# Patient Record
Sex: FEMALE | Race: WHITE | Employment: FULL TIME | URBAN - METROPOLITAN AREA
[De-identification: names, ages, dates, MRNs, and addresses within clinical notes are randomized per-mention and may not be internally consistent; named-entity substitution may affect disease eponyms.]

---

## 2017-01-31 ENCOUNTER — HOSPITAL ENCOUNTER (OUTPATIENT)
Dept: OTHER | Age: 52
Discharge: HOME | End: 2017-01-31
Attending: FAMILY MEDICINE | Admitting: FAMILY MEDICINE

## 2017-01-31 LAB
BASOPHILS ABSOLUTE: 0.1 K/UL (ref 0–0.2)
BASOPHILS RELATIVE PERCENT: 1 %
CHOLESTEROL, TOTAL: 140 MG/DL (ref 0–199)
CREATININE URINE: 351.5 MG/DL (ref 28–259)
EOSINOPHILS ABSOLUTE: 0.7 K/UL (ref 0–0.6)
EOSINOPHILS RELATIVE PERCENT: 9.8 %
HCT VFR BLD CALC: 44.1 % (ref 36–48)
HDLC SERPL-MCNC: 44 MG/DL (ref 40–60)
HEMOGLOBIN: 14.9 G/DL (ref 12–16)
LDL CHOLESTEROL CALCULATED: 69 MG/DL
LYMPHOCYTES ABSOLUTE: 2.8 K/UL (ref 1–5.1)
LYMPHOCYTES RELATIVE PERCENT: 38 %
MCH RBC QN AUTO: 28.1 PG (ref 26–34)
MCHC RBC AUTO-ENTMCNC: 33.7 G/DL (ref 31–36)
MCV RBC AUTO: 83.5 FL (ref 80–100)
MICROALBUMIN UR-MCNC: 1.7 MG/DL
MICROALBUMIN/CREAT UR-RTO: 4.8 MG/G (ref 0–30)
MONOCYTES ABSOLUTE: 0.7 K/UL (ref 0–1.3)
MONOCYTES RELATIVE PERCENT: 8.9 %
NEUTROPHILS ABSOLUTE: 3.1 K/UL (ref 1.7–7.7)
NEUTROPHILS RELATIVE PERCENT: 42.3 %
PDW BLD-RTO: 13.3 % (ref 12.4–15.4)
PLATELET # BLD: 220 K/UL (ref 135–450)
PMV BLD AUTO: 9.5 FL (ref 5–10.5)
RBC # BLD: 5.28 M/UL (ref 4–5.2)
TRIGL SERPL-MCNC: 136 MG/DL (ref 0–150)
TSH SERPL DL<=0.05 MIU/L-ACNC: 3.2 UIU/ML (ref 0.27–4.2)
VLDLC SERPL CALC-MCNC: 27 MG/DL
WBC # BLD: 7.3 K/UL (ref 4–11)

## 2017-02-01 LAB
ESTIMATED AVERAGE GLUCOSE: 171.4 MG/DL
HBA1C MFR BLD: 7.6 %

## 2017-04-11 ENCOUNTER — EMPLOYEE WELLNESS (OUTPATIENT)
Dept: OTHER | Age: 52
End: 2017-04-11

## 2017-04-11 LAB
CHOLESTEROL, TOTAL: 153 MG/DL (ref 0–199)
GLUCOSE BLD-MCNC: 172 MG/DL (ref 70–99)
HDLC SERPL-MCNC: 44 MG/DL (ref 40–60)
LDL CHOLESTEROL CALCULATED: 79 MG/DL
TRIGL SERPL-MCNC: 151 MG/DL (ref 0–150)

## 2017-04-12 LAB
ESTIMATED AVERAGE GLUCOSE: 171.4 MG/DL
HBA1C MFR BLD: 7.6 %

## 2017-05-13 ENCOUNTER — HOSPITAL ENCOUNTER (OUTPATIENT)
Dept: OTHER | Age: 52
Discharge: HOME | End: 2017-05-13
Attending: FAMILY MEDICINE | Admitting: FAMILY MEDICINE

## 2017-05-13 LAB
A/G RATIO: 1.3 (ref 1.1–2.2)
ALBUMIN SERPL-MCNC: 3.9 G/DL (ref 3.4–5)
ALP BLD-CCNC: 101 U/L (ref 40–129)
ALT SERPL-CCNC: 71 U/L (ref 10–40)
ANION GAP SERPL CALCULATED.3IONS-SCNC: 12 MMOL/L (ref 3–16)
AST SERPL-CCNC: 83 U/L (ref 15–37)
BASOPHILS ABSOLUTE: 0.1 K/UL (ref 0–0.2)
BASOPHILS RELATIVE PERCENT: 0.8 %
BILIRUB SERPL-MCNC: 0.6 MG/DL (ref 0–1)
BUN BLDV-MCNC: 7 MG/DL (ref 7–20)
CALCIUM SERPL-MCNC: 8.6 MG/DL (ref 8.3–10.6)
CHLORIDE BLD-SCNC: 103 MMOL/L (ref 99–110)
CO2: 25 MMOL/L (ref 21–32)
CREAT SERPL-MCNC: 0.6 MG/DL (ref 0.6–1.1)
EOSINOPHILS ABSOLUTE: 0.5 K/UL (ref 0–0.6)
EOSINOPHILS RELATIVE PERCENT: 5.1 %
GFR AFRICAN AMERICAN: >60
GFR NON-AFRICAN AMERICAN: >60
GLOBULIN: 2.9 G/DL
GLUCOSE BLD-MCNC: 150 MG/DL (ref 70–99)
HCT VFR BLD CALC: 41.4 % (ref 36–48)
HEMOGLOBIN: 14 G/DL (ref 12–16)
LYMPHOCYTES ABSOLUTE: 3.1 K/UL (ref 1–5.1)
LYMPHOCYTES RELATIVE PERCENT: 32.6 %
MCH RBC QN AUTO: 28.1 PG (ref 26–34)
MCHC RBC AUTO-ENTMCNC: 33.7 G/DL (ref 31–36)
MCV RBC AUTO: 83.2 FL (ref 80–100)
MONOCYTES ABSOLUTE: 0.6 K/UL (ref 0–1.3)
MONOCYTES RELATIVE PERCENT: 6 %
NEUTROPHILS ABSOLUTE: 5.3 K/UL (ref 1.7–7.7)
NEUTROPHILS RELATIVE PERCENT: 55.5 %
PDW BLD-RTO: 13.3 % (ref 12.4–15.4)
PLATELET # BLD: 196 K/UL (ref 135–450)
PMV BLD AUTO: 9.6 FL (ref 5–10.5)
POTASSIUM SERPL-SCNC: 3.8 MMOL/L (ref 3.5–5.1)
RBC # BLD: 4.98 M/UL (ref 4–5.2)
SODIUM BLD-SCNC: 140 MMOL/L (ref 136–145)
TOTAL PROTEIN: 6.8 G/DL (ref 6.4–8.2)
TSH SERPL DL<=0.05 MIU/L-ACNC: 3.46 UIU/ML (ref 0.27–4.2)
WBC # BLD: 9.6 K/UL (ref 4–11)

## 2017-05-15 LAB
ESTIMATED AVERAGE GLUCOSE: 180 MG/DL
HBA1C MFR BLD: 7.9 %

## 2017-06-30 ENCOUNTER — OFFICE VISIT (OUTPATIENT)
Dept: URGENT CARE | Age: 52
End: 2017-06-30

## 2017-06-30 VITALS
OXYGEN SATURATION: 93 % | DIASTOLIC BLOOD PRESSURE: 74 MMHG | RESPIRATION RATE: 16 BRPM | TEMPERATURE: 99.5 F | BODY MASS INDEX: 39.87 KG/M2 | HEIGHT: 67 IN | HEART RATE: 96 BPM | WEIGHT: 254 LBS | SYSTOLIC BLOOD PRESSURE: 116 MMHG

## 2017-06-30 DIAGNOSIS — J20.9 ACUTE BRONCHITIS, UNSPECIFIED ORGANISM: Primary | ICD-10-CM

## 2017-06-30 PROCEDURE — 99203 OFFICE O/P NEW LOW 30 MIN: CPT | Performed by: EMERGENCY MEDICINE

## 2017-06-30 RX ORDER — GUAIFENESIN AND DEXTROMETHORPHAN HYDROBROMIDE 600; 30 MG/1; MG/1
1 TABLET, EXTENDED RELEASE ORAL 2 TIMES DAILY PRN
Qty: 18 TABLET | Refills: 0 | Status: SHIPPED | OUTPATIENT
Start: 2017-06-30 | End: 2018-01-24 | Stop reason: ALTCHOICE

## 2017-06-30 RX ORDER — AZITHROMYCIN 250 MG/1
TABLET, FILM COATED ORAL
Qty: 1 PACKET | Refills: 0 | Status: SHIPPED | OUTPATIENT
Start: 2017-06-30 | End: 2017-07-10

## 2017-06-30 RX ORDER — BENZONATATE 200 MG/1
200 CAPSULE ORAL 3 TIMES DAILY PRN
Qty: 15 CAPSULE | Refills: 0 | Status: SHIPPED | OUTPATIENT
Start: 2017-06-30 | End: 2018-01-24 | Stop reason: ALTCHOICE

## 2017-06-30 RX ORDER — SERTRALINE HYDROCHLORIDE 100 MG/1
100 TABLET, FILM COATED ORAL EVERY EVENING
COMMUNITY

## 2017-06-30 RX ORDER — ATORVASTATIN CALCIUM 80 MG/1
80 TABLET, FILM COATED ORAL EVERY EVENING
COMMUNITY

## 2017-06-30 RX ORDER — OMEPRAZOLE 40 MG/1
40 CAPSULE, DELAYED RELEASE ORAL EVERY EVENING
COMMUNITY
End: 2022-01-10

## 2017-06-30 RX ORDER — GLYBURIDE 2.5 MG/1
2.5 TABLET ORAL
COMMUNITY
End: 2018-01-24 | Stop reason: SDUPTHER

## 2017-06-30 ASSESSMENT — ENCOUNTER SYMPTOMS
WHEEZING: 1
EYE PAIN: 0
ABDOMINAL PAIN: 0
RHINORRHEA: 0
EYE DISCHARGE: 0
COUGH: 1
DIARRHEA: 0
HEMOPTYSIS: 0
SHORTNESS OF BREATH: 1
TROUBLE SWALLOWING: 0
NAUSEA: 0
VOMITING: 0
SORE THROAT: 1

## 2017-08-11 ENCOUNTER — HOSPITAL ENCOUNTER (OUTPATIENT)
Dept: MAMMOGRAPHY | Age: 52
Discharge: HOME | End: 2017-08-11
Attending: FAMILY MEDICINE | Admitting: FAMILY MEDICINE

## 2017-08-11 DIAGNOSIS — Z12.31 VISIT FOR SCREENING MAMMOGRAM: ICD-10-CM

## 2017-11-06 ENCOUNTER — OFFICE VISIT (OUTPATIENT)
Dept: URGENT CARE | Age: 52
End: 2017-11-06

## 2017-11-06 VITALS
BODY MASS INDEX: 39.71 KG/M2 | OXYGEN SATURATION: 94 % | TEMPERATURE: 97.6 F | HEIGHT: 67 IN | SYSTOLIC BLOOD PRESSURE: 118 MMHG | DIASTOLIC BLOOD PRESSURE: 81 MMHG | WEIGHT: 253 LBS | RESPIRATION RATE: 14 BRPM | HEART RATE: 90 BPM

## 2017-11-06 DIAGNOSIS — J01.90 ACUTE SINUSITIS, RECURRENCE NOT SPECIFIED, UNSPECIFIED LOCATION: Primary | ICD-10-CM

## 2017-11-06 PROCEDURE — 99212 OFFICE O/P EST SF 10 MIN: CPT | Performed by: PHYSICIAN ASSISTANT

## 2017-11-06 RX ORDER — METHYLPREDNISOLONE 4 MG/1
TABLET ORAL
Qty: 1 KIT | Refills: 0 | Status: SHIPPED | OUTPATIENT
Start: 2017-11-06 | End: 2018-01-24 | Stop reason: ALTCHOICE

## 2017-11-06 RX ORDER — OXYBUTYNIN CHLORIDE 5 MG/1
1 TABLET ORAL 3 TIMES DAILY
COMMUNITY
Start: 2017-07-03 | End: 2018-01-24 | Stop reason: ALTCHOICE

## 2017-11-06 ASSESSMENT — ENCOUNTER SYMPTOMS
STRIDOR: 0
COUGH: 1
SORE THROAT: 1
DIARRHEA: 1
HEMOPTYSIS: 0
EYE DISCHARGE: 0
EYE REDNESS: 0
VOMITING: 0
SPUTUM PRODUCTION: 0
WHEEZING: 0
SHORTNESS OF BREATH: 0
NAUSEA: 0
SINUS PAIN: 1
ABDOMINAL PAIN: 0

## 2017-11-06 NOTE — PATIENT INSTRUCTIONS
Patient Education        Sinusitis: Care Instructions  Your Care Instructions    Sinusitis is an infection of the lining of the sinus cavities in your head. Sinusitis often follows a cold. It causes pain and pressure in your head and face. In most cases, sinusitis gets better on its own in 1 to 2 weeks. But some mild symptoms may last for several weeks. Sometimes antibiotics are needed. Follow-up care is a key part of your treatment and safety. Be sure to make and go to all appointments, and call your doctor if you are having problems. It's also a good idea to know your test results and keep a list of the medicines you take. How can you care for yourself at home? · Take an over-the-counter pain medicine, such as acetaminophen (Tylenol), ibuprofen (Advil, Motrin), or naproxen (Aleve). Read and follow all instructions on the label. · If the doctor prescribed antibiotics, take them as directed. Do not stop taking them just because you feel better. You need to take the full course of antibiotics. · Be careful when taking over-the-counter cold or flu medicines and Tylenol at the same time. Many of these medicines have acetaminophen, which is Tylenol. Read the labels to make sure that you are not taking more than the recommended dose. Too much acetaminophen (Tylenol) can be harmful. · Breathe warm, moist air from a steamy shower, a hot bath, or a sink filled with hot water. Avoid cold, dry air. Using a humidifier in your home may help. Follow the directions for cleaning the machine. · Use saline (saltwater) nasal washes to help keep your nasal passages open and wash out mucus and bacteria. You can buy saline nose drops at a grocery store or drugstore. Or you can make your own at home by adding 1 teaspoon of salt and 1 teaspoon of baking soda to 2 cups of distilled water. If you make your own, fill a bulb syringe with the solution, insert the tip into your nostril, and squeeze gently. Cathleen Fogo your nose.   · Put a hot, wet towel or a warm gel pack on your face 3 or 4 times a day for 5 to 10 minutes each time. · Try a decongestant nasal spray like oxymetazoline (Afrin). Do not use it for more than 3 days in a row. Using it for more than 3 days can make your congestion worse. When should you call for help? Call your doctor now or seek immediate medical care if:  · You have new or worse swelling or redness in your face or around your eyes. · You have a new or higher fever. Watch closely for changes in your health, and be sure to contact your doctor if:  · You have new or worse facial pain. · The mucus from your nose becomes thicker (like pus) or has new blood in it. · You are not getting better as expected. Where can you learn more? Go to https://VidyopeosmanyC$ [email or web address].DescribeMe. org and sign in to your [employer] account. Enter U427 in the [employer] box to learn more about \"Sinusitis: Care Instructions. \"     If you do not have an account, please click on the \"Sign Up Now\" link. Current as of: July 29, 2016  Content Version: 11.3  © 7465-9105 [employer]. Care instructions adapted under license by [facility] ([facility]). If you have questions about a medical condition or this instruction, always ask your healthcare professional. [address]  any warranty or liability for your use of this information.

## 2017-11-06 NOTE — PROGRESS NOTES
Reason for Visit:   Chief Complaint   Patient presents with    Sinus Problem     sneezing, sinus pain, coughing x 2 days     Patient History     HPI: Christian Frank is a 50 y.o. female who presents with frequent sneezing, nasal congestion, sinus pressure, PND, and sore throat for approx 2 days. This is a new problem. She reports occasional cough with this. She denies any hx of seasonal allergies or recent ill contacts. She denies fever, chest congestion, wheeze, abdominal pain, N/V, diarrhea, or rash. Past Medical History:   Diagnosis Date    Depression     GERD (gastroesophageal reflux disease)     Hyperlipidemia     Type 2 diabetes mellitus without complication (HCC)        Past Surgical History:   Procedure Laterality Date    CHOLECYSTECTOMY      HYSTERECTOMY         Allergies: No Known Allergies    Review of Systems     ROS: Please refer to HPI for any pertinent positive findings, as all other systems were reviewed as negative unless otherwise listed above. Review of Systems   Constitutional: Negative for chills, diaphoresis, fever and malaise/fatigue. HENT: Positive for congestion, sinus pain and sore throat. Negative for ear pain. Eyes: Negative for discharge and redness. Respiratory: Positive for cough. Negative for hemoptysis, sputum production, shortness of breath, wheezing and stridor. Gastrointestinal: Positive for diarrhea. Negative for abdominal pain, nausea and vomiting. Skin: Negative for rash. Neurological: Negative for dizziness and headaches. Physical Exam     Vitals:    11/06/17 0933   BP: 118/81   Pulse: 90   Resp: 14   Temp: 97.6 °F (36.4 °C)   SpO2: 94%       Constitutional:  Well developed, well nourished, no acute distress, non-toxic appearance   Eyes:  PERRL, conjunctiva normal, no ciliary injection, evidence of foreign body, or discharge.   HENT:  Head is normocephalic, atraumatic; external ears and TMs normal bilaterally, Nasal mucosa congested, oropharynx pink and moist, no pharyngeal exudates. Neck- Supple, passive and active range of motion in tact, no tenderness upon palpation along spine. No LAD  or neck masses appreciated. Respiratory:  No respiratory distress, normal breath sounds bilaterally, no rales, no wheezing. Cardiovascular:  Normal rate, normal rhythm, no murmurs, no gallops, no rubs   GI:  Abdomen soft, nontender, nondistended, normal bowel sounds, no organomegaly, no mass, no rebound, no guarding. Musculoskeletal:  No pain upon palpation along spine or musculature. No edema, no tenderness, no deformities. Integument:  Well hydrated, no lesions, cyanosis, or rashes appreciated. Lymphatic:  No lymphadenopathy noted   Neurologic:  Alert & oriented x 3, CN 2-12 in tact bilaterally, normal motor function and sensation in tact, no focal deficits noted   Psychiatric:  Speech and behavior appropriate. Appropriate mood and affect. Physical Exam    Procedure:   none    Assessment:    1. Acute sinusitis, recurrence not specified, unspecified location        Plan:    - Discussed likely viral URI vs possible environmental allergy. Fluids, nasal saline, humidification and rest encouraged. Patient encouraged to start using nasal steroid and oral antihistamine for the next 1-2 weeks and OTC meds as needed for any cough. Follow up with PCP in the next 3-5 days if sxs and/or fever persists. Orders Placed This Encounter   Medications    methylPREDNISolone (MEDROL, MERCEDEZ,) 4 MG tablet     Sig: Take by mouth.      Dispense:  1 kit     Refill:  0

## 2017-11-20 ENCOUNTER — CLINICAL DOCUMENTATION (OUTPATIENT)
Dept: PHARMACY | Facility: CLINIC | Age: 52
End: 2017-11-20

## 2017-11-20 NOTE — LETTER
? Visit with your physician (Second yearly visit)  ? Second A1c  ? Flu vaccination   ? Requirements if A1c is greater than 8 percent:  ? Engage with a [facility] ([facility]) diabetes educator at one of our hospital locations or an ambulatory care coordinator by phone, if your A1c is greater than 8 percent. We will be reviewing your [employer] account and sending you reminders for needed actions. Please remember if you dont have a [address], you will need to submit documentation to [email or web address] or by fax to [phone]. As a reminder, if programs requirements are not met, your benefit may be terminated and you will not be eligible to participate in the program next year. Thank you for participating in the program and taking steps to improve your health.

## 2018-01-23 ENCOUNTER — SCHEDULED TELEPHONE ENCOUNTER (OUTPATIENT)
Dept: PHARMACY | Facility: CLINIC | Age: 53
End: 2018-01-23

## 2018-01-23 DIAGNOSIS — Z71.89 ENCOUNTER FOR MEDICATION REVIEW AND COUNSELING: Primary | ICD-10-CM

## 2018-01-24 ENCOUNTER — HOSPITAL ENCOUNTER (OUTPATIENT)
Dept: OTHER | Age: 53
Discharge: HOME | End: 2018-01-24
Attending: INTERNAL MEDICINE | Admitting: INTERNAL MEDICINE

## 2018-01-24 VITALS
HEIGHT: 67 IN | DIASTOLIC BLOOD PRESSURE: 68 MMHG | SYSTOLIC BLOOD PRESSURE: 125 MMHG | BODY MASS INDEX: 40.81 KG/M2 | WEIGHT: 260 LBS | TEMPERATURE: 97.9 F | RESPIRATION RATE: 16 BRPM | OXYGEN SATURATION: 95 % | HEART RATE: 71 BPM

## 2018-01-24 LAB
GLUCOSE BLD-MCNC: 142 MG/DL (ref 70–99)
GLUCOSE BLD-MCNC: 164 MG/DL (ref 70–99)
PERFORMED ON: ABNORMAL
PERFORMED ON: ABNORMAL

## 2018-01-24 RX ORDER — SODIUM CHLORIDE, SODIUM LACTATE, POTASSIUM CHLORIDE, CALCIUM CHLORIDE 600; 310; 30; 20 MG/100ML; MG/100ML; MG/100ML; MG/100ML
INJECTION, SOLUTION INTRAVENOUS CONTINUOUS
Status: CANCELLED | OUTPATIENT
Start: 2018-01-24

## 2018-01-24 RX ORDER — SOLIFENACIN SUCCINATE 10 MG/1
10 TABLET, FILM COATED ORAL EVERY EVENING
COMMUNITY

## 2018-01-24 RX ORDER — METFORMIN HYDROCHLORIDE 500 MG/1
2000 TABLET, EXTENDED RELEASE ORAL EVERY EVENING
Qty: 1 TABLET | Refills: 0 | COMMUNITY
Start: 2018-01-24

## 2018-01-24 RX ORDER — SODIUM CHLORIDE, SODIUM LACTATE, POTASSIUM CHLORIDE, CALCIUM CHLORIDE 600; 310; 30; 20 MG/100ML; MG/100ML; MG/100ML; MG/100ML
INJECTION, SOLUTION INTRAVENOUS CONTINUOUS
Status: DISCONTINUED | OUTPATIENT
Start: 2018-01-24 | End: 2018-01-25 | Stop reason: HOSPADM

## 2018-01-24 RX ORDER — GLIMEPIRIDE 2 MG/1
2 TABLET ORAL EVERY EVENING
Qty: 1 TABLET | Refills: 0 | COMMUNITY
Start: 2018-01-24 | End: 2020-04-30

## 2018-01-24 RX ADMIN — SODIUM CHLORIDE, SODIUM LACTATE, POTASSIUM CHLORIDE, CALCIUM CHLORIDE: 600; 310; 30; 20 INJECTION, SOLUTION INTRAVENOUS at 07:05

## 2018-01-24 ASSESSMENT — PAIN SCALES - GENERAL
PAINLEVEL_OUTOF10: 0
PAINLEVEL_OUTOF10: 0

## 2018-01-24 ASSESSMENT — PAIN - FUNCTIONAL ASSESSMENT: PAIN_FUNCTIONAL_ASSESSMENT: 0-10

## 2018-01-24 NOTE — TELEPHONE ENCOUNTER
Reviewed and agree with PharmD candidate with the following modifications: per reconciliation of outside immunizations, received [id number] 7/9/2013 which is up to date. Updated medication list per below. Updated DM program gaps identified.     Gordon WilsonD, [facility] Pharmacist  Direct: [phone]  Dept: [phone] (toll free [phone], option 7)
Value Date    LDLCALC 79 04/11/2017     ALT   Date Value Ref Range Status   05/13/2017 71 (H) 10 - 40 U/L Final     The 10-year ASCVD risk score (Yoni Varner et al., 2013) is: 1.9%    Values used to calculate the score:      Age: 50 years      Sex: Female      Is Non- : No      Diabetic: Yes      Tobacco smoker: No      Systolic Blood Pressure: 368 mmHg      Is BP treated: No      HDL Cholesterol: 44 mg/dL      Total Cholesterol: 153 mg/dL       Immunizations: There is no immunization history on file for this patient. Smoking Status:  History   Smoking Status    Never Smoker   Smokeless Tobacco    Not on file      ASSESSMENT:  Initial Program Requirements (to be completed by 7/1/2018):  [] OV with provider for DM (1st)  [] A1c (1st)  [x] On statin or contraindication(s) On atorvastatin   [x] On ACEi/ARB or contraindication(s) Normal blood pressure, urinary albumin-to-creatinine ratio, and eGFR     Ongoing Program Requirements (to be completed by 12/15/2018):  [] OV with provider for DM (2nd)  [] ACC/diabetes educator visit (if A1c over 8%)  [] A1c (2nd)  [] Lipid panel  [] Urine protein  [] Pneumococcal vaccination: up to date  [] Influenza vaccination for 4598-8150  [] Medication adherence over 70%    Formulary Medication Review:  - Medications/supplies on diabetes program formulary (up to $600 in waived copays, if filled through [facility] (mail order)): metformin, atorvastatin, glimepiride, agamatrix meter and supplies      Other Considerations:  - Glycemic Goal: <7.0%. Is not at blood glucose goal but is on metformin and glimepiride  therapy. .   - Blood Pressure Goal: BP less than 140/90 mmHg due to history of DM: Is at blood pressure goal   - Lipids: Patient is 43-69 years of age with DM, a 10 year risk score under 7.5% and no signs of ASCVD. so is a candidate for moderate intensity statin . patient is on high intensity.  Continue to assess lipid panel results and

## 2018-01-24 NOTE — H&P
Pre-sedation Assessment    HPI: screening  Nurses notes reviewed and agreed. Medications and Allergies reviewed    Physical Exam:  Airway:Normal  Cardiac:Normal  Pulmonary:Normal  Abdomen:Normal    Assessment/Plan:  ASA Grade 2 - Patient with mild systemic disease with no functional limitations  Level of Sedation Plan: Moderate sedation  Post Procedure plan: Return to same level of care  I have explained the risk, benefits, and alternatives to the procedure. The patient understands and agrees to proceed.   Yes    Kale Sam  7:02 AM 1/24/2018

## 2018-01-24 NOTE — PROGRESS NOTES
Received patient from GI room. Report received from [facility]. Patient is drowsy. Family at bedside.

## 2018-03-14 ENCOUNTER — OFFICE VISIT (OUTPATIENT)
Dept: URGENT CARE | Age: 53
End: 2018-03-14

## 2018-03-14 VITALS
TEMPERATURE: 96.3 F | BODY MASS INDEX: 38.92 KG/M2 | HEIGHT: 67 IN | DIASTOLIC BLOOD PRESSURE: 71 MMHG | HEART RATE: 70 BPM | SYSTOLIC BLOOD PRESSURE: 117 MMHG | RESPIRATION RATE: 16 BRPM | WEIGHT: 248 LBS | OXYGEN SATURATION: 96 %

## 2018-03-14 DIAGNOSIS — N39.0 URINARY TRACT INFECTION WITHOUT HEMATURIA, SITE UNSPECIFIED: Primary | ICD-10-CM

## 2018-03-14 LAB
APPEARANCE FLUID: ABNORMAL
BILIRUBIN, POC: ABNORMAL
BLOOD URINE, POC: ABNORMAL
CLARITY, POC: CLEAR
COLOR, POC: YELLOW
GLUCOSE URINE, POC: ABNORMAL
KETONES, POC: ABNORMAL
LEUKOCYTE EST, POC: ABNORMAL
NITRITE, POC: ABNORMAL
PH, POC: 5.5
PROTEIN, POC: ABNORMAL
SPECIFIC GRAVITY, POC: 1.03
UROBILINOGEN, POC: ABNORMAL

## 2018-03-14 PROCEDURE — 99214 OFFICE O/P EST MOD 30 MIN: CPT | Performed by: EMERGENCY MEDICINE

## 2018-03-14 PROCEDURE — 81002 URINALYSIS NONAUTO W/O SCOPE: CPT | Performed by: EMERGENCY MEDICINE

## 2018-03-14 RX ORDER — SULFAMETHOXAZOLE AND TRIMETHOPRIM 800; 160 MG/1; MG/1
1 TABLET ORAL 2 TIMES DAILY
Qty: 14 TABLET | Refills: 0 | Status: SHIPPED | OUTPATIENT
Start: 2018-03-14 | End: 2018-03-21

## 2018-03-14 ASSESSMENT — ENCOUNTER SYMPTOMS: NAUSEA: 0

## 2018-03-14 NOTE — PATIENT INSTRUCTIONS
sex.  · Change sanitary pads often. · Avoid douches, bubble baths, feminine hygiene sprays, and other feminine hygiene products that have deodorants. · After going to the bathroom, wipe from front to back. When should you call for help? Call your doctor now or seek immediate medical care if:  ? · Symptoms such as fever, chills, nausea, or vomiting get worse or appear for the first time. ? · You have new pain in your back just below your rib cage. This is called flank pain. ? · There is new blood or pus in your urine. ? · You have any problems with your antibiotic medicine. ? Watch closely for changes in your health, and be sure to contact your doctor if:  ? · You are not getting better after taking an antibiotic for 2 days. ? · Your symptoms go away but then come back. Where can you learn more? Go to https://Bountiipepiceweb.Food and Beverage. org and sign in to your [employer] account. Enter C258 in the [employer] box to learn more about \"Urinary Tract Infection in Women: Care Instructions. \"     If you do not have an account, please click on the \"Sign Up Now\" link. Current as of: May 12, 2017  Content Version: 11.5  © 1215-7763 Healthwise, Incorporated. Care instructions adapted under license by [facility] ([facility]). If you have questions about a medical condition or this instruction, always ask your healthcare professional. [place] [address] any warranty or liability for your use of this information.

## 2018-03-20 VITALS — WEIGHT: 260 LBS

## 2018-04-17 ENCOUNTER — EMPLOYEE WELLNESS (OUTPATIENT)
Dept: OTHER | Age: 53
End: 2018-04-17

## 2018-04-17 LAB
CHOLESTEROL, TOTAL: 165 MG/DL (ref 0–199)
GLUCOSE BLD-MCNC: 111 MG/DL (ref 70–99)
HDLC SERPL-MCNC: 52 MG/DL (ref 40–60)
LDL CHOLESTEROL CALCULATED: 82 MG/DL
TRIGL SERPL-MCNC: 156 MG/DL (ref 0–150)

## 2018-04-18 LAB
ESTIMATED AVERAGE GLUCOSE: 157.1 MG/DL
HBA1C MFR BLD: 7.1 %

## 2018-04-23 VITALS — WEIGHT: 249 LBS | BODY MASS INDEX: 39 KG/M2

## 2018-06-22 ENCOUNTER — TELEPHONE (OUTPATIENT)
Dept: PHARMACY | Facility: CLINIC | Age: 53
End: 2018-06-22

## 2018-07-18 ENCOUNTER — HOSPITAL ENCOUNTER (OUTPATIENT)
Dept: ULTRASOUND IMAGING | Age: 53
Discharge: HOME | End: 2018-07-18
Payer: COMMERCIAL

## 2018-07-18 DIAGNOSIS — R94.5 ABNORMAL RESULTS OF LIVER FUNCTION STUDIES: ICD-10-CM

## 2018-07-18 PROCEDURE — 76705 ECHO EXAM OF ABDOMEN: CPT

## 2018-07-25 ENCOUNTER — CLINICAL DOCUMENTATION (OUTPATIENT)
Dept: PHARMACY | Facility: CLINIC | Age: 53
End: 2018-07-25

## 2018-08-13 ENCOUNTER — HOSPITAL ENCOUNTER (OUTPATIENT)
Dept: MAMMOGRAPHY | Age: 53
Discharge: HOME | End: 2018-08-18
Payer: COMMERCIAL

## 2018-08-13 DIAGNOSIS — Z12.31 VISIT FOR SCREENING MAMMOGRAM: ICD-10-CM

## 2018-08-13 PROCEDURE — 77067 SCR MAMMO BI INCL CAD: CPT

## 2018-09-17 ENCOUNTER — CLINICAL DOCUMENTATION (OUTPATIENT)
Dept: PHARMACY | Facility: CLINIC | Age: 53
End: 2018-09-17

## 2018-11-20 RX ORDER — IBUPROFEN 200 MG
600 TABLET ORAL PRN
COMMUNITY
End: 2021-01-11

## 2018-11-21 ENCOUNTER — ANESTHESIA EVENT (OUTPATIENT)
Dept: OPERATING ROOM | Age: 53
End: 2018-11-21
Payer: COMMERCIAL

## 2018-11-26 ENCOUNTER — HOSPITAL ENCOUNTER (OUTPATIENT)
Age: 53
Setting detail: OUTPATIENT SURGERY
Discharge: HOME | End: 2018-11-26
Attending: UROLOGY | Admitting: UROLOGY
Payer: COMMERCIAL

## 2018-11-26 ENCOUNTER — ANESTHESIA (OUTPATIENT)
Dept: OPERATING ROOM | Age: 53
End: 2018-11-26
Payer: COMMERCIAL

## 2018-11-26 VITALS
DIASTOLIC BLOOD PRESSURE: 73 MMHG | SYSTOLIC BLOOD PRESSURE: 140 MMHG | OXYGEN SATURATION: 88 % | RESPIRATION RATE: 18 BRPM

## 2018-11-26 VITALS
OXYGEN SATURATION: 96 % | BODY MASS INDEX: 39.38 KG/M2 | HEIGHT: 67 IN | TEMPERATURE: 97.4 F | WEIGHT: 250.88 LBS | DIASTOLIC BLOOD PRESSURE: 77 MMHG | RESPIRATION RATE: 18 BRPM | SYSTOLIC BLOOD PRESSURE: 129 MMHG | HEART RATE: 71 BPM

## 2018-11-26 DIAGNOSIS — G89.18 POST-OP PAIN: Primary | ICD-10-CM

## 2018-11-26 LAB
GLUCOSE BLD-MCNC: 129 MG/DL (ref 70–99)
GLUCOSE BLD-MCNC: 149 MG/DL (ref 70–99)
PERFORMED ON: ABNORMAL
PERFORMED ON: ABNORMAL

## 2018-11-26 PROCEDURE — 2580000003 HC RX 258: Performed by: ANESTHESIOLOGY

## 2018-11-26 PROCEDURE — S0028 INJECTION, FAMOTIDINE, 20 MG: HCPCS | Performed by: ANESTHESIOLOGY

## 2018-11-26 PROCEDURE — 7100000000 HC PACU RECOVERY - FIRST 15 MIN: Performed by: UROLOGY

## 2018-11-26 PROCEDURE — C1771 REP DEV, URINARY, W/SLING: HCPCS | Performed by: UROLOGY

## 2018-11-26 PROCEDURE — 7100000010 HC PHASE II RECOVERY - FIRST 15 MIN: Performed by: UROLOGY

## 2018-11-26 PROCEDURE — 6360000002 HC RX W HCPCS: Performed by: NURSE ANESTHETIST, CERTIFIED REGISTERED

## 2018-11-26 PROCEDURE — 7100000011 HC PHASE II RECOVERY - ADDTL 15 MIN: Performed by: UROLOGY

## 2018-11-26 PROCEDURE — 2580000003 HC RX 258: Performed by: NURSE ANESTHETIST, CERTIFIED REGISTERED

## 2018-11-26 PROCEDURE — 2500000003 HC RX 250 WO HCPCS: Performed by: ANESTHESIOLOGY

## 2018-11-26 PROCEDURE — 3700000000 HC ANESTHESIA ATTENDED CARE: Performed by: UROLOGY

## 2018-11-26 PROCEDURE — 2500000003 HC RX 250 WO HCPCS: Performed by: NURSE ANESTHETIST, CERTIFIED REGISTERED

## 2018-11-26 PROCEDURE — 2709999900 HC NON-CHARGEABLE SUPPLY: Performed by: UROLOGY

## 2018-11-26 PROCEDURE — 7100000001 HC PACU RECOVERY - ADDTL 15 MIN: Performed by: UROLOGY

## 2018-11-26 PROCEDURE — 2500000003 HC RX 250 WO HCPCS: Performed by: UROLOGY

## 2018-11-26 PROCEDURE — 3700000001 HC ADD 15 MINUTES (ANESTHESIA): Performed by: UROLOGY

## 2018-11-26 PROCEDURE — 3600000004 HC SURGERY LEVEL 4 BASE: Performed by: UROLOGY

## 2018-11-26 PROCEDURE — 3600000014 HC SURGERY LEVEL 4 ADDTL 15MIN: Performed by: UROLOGY

## 2018-11-26 DEVICE — DESARA® BLUE SHORT SUTURE SLING FOR FEMALE STRESS URINARY INCONTINENCE
Type: IMPLANTABLE DEVICE | Site: VAGINA | Status: FUNCTIONAL
Brand: DESARA BLUE SHORT SUTURE SLING

## 2018-11-26 RX ORDER — CEFTRIAXONE 1 G/1
INJECTION, POWDER, FOR SOLUTION INTRAMUSCULAR; INTRAVENOUS PRN
Status: DISCONTINUED | OUTPATIENT
Start: 2018-11-26 | End: 2018-11-26 | Stop reason: SDUPTHER

## 2018-11-26 RX ORDER — SODIUM CHLORIDE 0.9 % (FLUSH) 0.9 %
10 SYRINGE (ML) INJECTION EVERY 12 HOURS SCHEDULED
Status: DISCONTINUED | OUTPATIENT
Start: 2018-11-26 | End: 2018-11-26 | Stop reason: HOSPADM

## 2018-11-26 RX ORDER — EPHEDRINE SULFATE/0.9% NACL/PF 50 MG/5 ML
SYRINGE (ML) INTRAVENOUS PRN
Status: DISCONTINUED | OUTPATIENT
Start: 2018-11-26 | End: 2018-11-26 | Stop reason: SDUPTHER

## 2018-11-26 RX ORDER — SODIUM CHLORIDE 9 MG/ML
INJECTION, SOLUTION INTRAVENOUS CONTINUOUS PRN
Status: DISCONTINUED | OUTPATIENT
Start: 2018-11-26 | End: 2018-11-26 | Stop reason: SDUPTHER

## 2018-11-26 RX ORDER — FENTANYL CITRATE 50 UG/ML
25 INJECTION, SOLUTION INTRAMUSCULAR; INTRAVENOUS EVERY 5 MIN PRN
Status: DISCONTINUED | OUTPATIENT
Start: 2018-11-26 | End: 2018-11-26 | Stop reason: HOSPADM

## 2018-11-26 RX ORDER — LIDOCAINE HYDROCHLORIDE AND EPINEPHRINE 20; 5 MG/ML; UG/ML
INJECTION, SOLUTION EPIDURAL; INFILTRATION; INTRACAUDAL; PERINEURAL
Status: COMPLETED | OUTPATIENT
Start: 2018-11-26 | End: 2018-11-26

## 2018-11-26 RX ORDER — ONDANSETRON 2 MG/ML
INJECTION INTRAMUSCULAR; INTRAVENOUS PRN
Status: DISCONTINUED | OUTPATIENT
Start: 2018-11-26 | End: 2018-11-26 | Stop reason: SDUPTHER

## 2018-11-26 RX ORDER — PROPOFOL 10 MG/ML
INJECTION, EMULSION INTRAVENOUS PRN
Status: DISCONTINUED | OUTPATIENT
Start: 2018-11-26 | End: 2018-11-26 | Stop reason: SDUPTHER

## 2018-11-26 RX ORDER — SODIUM CHLORIDE 9 MG/ML
INJECTION, SOLUTION INTRAVENOUS CONTINUOUS
Status: DISCONTINUED | OUTPATIENT
Start: 2018-11-26 | End: 2018-11-26 | Stop reason: HOSPADM

## 2018-11-26 RX ORDER — PROMETHAZINE HYDROCHLORIDE 25 MG/ML
6.25 INJECTION, SOLUTION INTRAMUSCULAR; INTRAVENOUS
Status: DISCONTINUED | OUTPATIENT
Start: 2018-11-26 | End: 2018-11-26 | Stop reason: HOSPADM

## 2018-11-26 RX ORDER — SUCCINYLCHOLINE/SOD CL,ISO/PF 200MG/10ML
SYRINGE (ML) INTRAVENOUS PRN
Status: DISCONTINUED | OUTPATIENT
Start: 2018-11-26 | End: 2018-11-26 | Stop reason: SDUPTHER

## 2018-11-26 RX ORDER — FENTANYL CITRATE 50 UG/ML
50 INJECTION, SOLUTION INTRAMUSCULAR; INTRAVENOUS EVERY 5 MIN PRN
Status: DISCONTINUED | OUTPATIENT
Start: 2018-11-26 | End: 2018-11-26 | Stop reason: HOSPADM

## 2018-11-26 RX ORDER — ROCURONIUM BROMIDE 10 MG/ML
INJECTION, SOLUTION INTRAVENOUS PRN
Status: DISCONTINUED | OUTPATIENT
Start: 2018-11-26 | End: 2018-11-26 | Stop reason: SDUPTHER

## 2018-11-26 RX ORDER — DEXAMETHASONE SODIUM PHOSPHATE 4 MG/ML
INJECTION, SOLUTION INTRA-ARTICULAR; INTRALESIONAL; INTRAMUSCULAR; INTRAVENOUS; SOFT TISSUE PRN
Status: DISCONTINUED | OUTPATIENT
Start: 2018-11-26 | End: 2018-11-26 | Stop reason: SDUPTHER

## 2018-11-26 RX ORDER — MIDAZOLAM HYDROCHLORIDE 1 MG/ML
INJECTION INTRAMUSCULAR; INTRAVENOUS PRN
Status: DISCONTINUED | OUTPATIENT
Start: 2018-11-26 | End: 2018-11-26 | Stop reason: SDUPTHER

## 2018-11-26 RX ORDER — ONDANSETRON 2 MG/ML
4 INJECTION INTRAMUSCULAR; INTRAVENOUS
Status: DISCONTINUED | OUTPATIENT
Start: 2018-11-26 | End: 2018-11-26 | Stop reason: HOSPADM

## 2018-11-26 RX ORDER — SODIUM CHLORIDE 0.9 % (FLUSH) 0.9 %
10 SYRINGE (ML) INJECTION PRN
Status: DISCONTINUED | OUTPATIENT
Start: 2018-11-26 | End: 2018-11-26 | Stop reason: HOSPADM

## 2018-11-26 RX ORDER — HYDROCODONE BITARTRATE AND ACETAMINOPHEN 5; 325 MG/1; MG/1
1 TABLET ORAL EVERY 6 HOURS PRN
Qty: 18 TABLET | Refills: 0 | Status: SHIPPED | OUTPATIENT
Start: 2018-11-26 | End: 2018-11-29

## 2018-11-26 RX ORDER — FENTANYL CITRATE 50 UG/ML
INJECTION, SOLUTION INTRAMUSCULAR; INTRAVENOUS PRN
Status: DISCONTINUED | OUTPATIENT
Start: 2018-11-26 | End: 2018-11-26 | Stop reason: SDUPTHER

## 2018-11-26 RX ADMIN — FAMOTIDINE 20 MG: 10 INJECTION, SOLUTION INTRAVENOUS at 12:07

## 2018-11-26 RX ADMIN — Medication 10 MG: at 14:25

## 2018-11-26 RX ADMIN — SODIUM CHLORIDE: 9 INJECTION, SOLUTION INTRAVENOUS at 14:25

## 2018-11-26 RX ADMIN — DEXAMETHASONE SODIUM PHOSPHATE 8 MG: 4 INJECTION, SOLUTION INTRAMUSCULAR; INTRAVENOUS at 14:05

## 2018-11-26 RX ADMIN — FENTANYL CITRATE 50 MCG: 50 INJECTION INTRAMUSCULAR; INTRAVENOUS at 13:45

## 2018-11-26 RX ADMIN — Medication 100 MG: at 13:45

## 2018-11-26 RX ADMIN — CEFTRIAXONE 2 G: 1 INJECTION, POWDER, FOR SOLUTION INTRAMUSCULAR; INTRAVENOUS at 13:50

## 2018-11-26 RX ADMIN — MIDAZOLAM 2 MG: 1 INJECTION INTRAMUSCULAR; INTRAVENOUS at 13:45

## 2018-11-26 RX ADMIN — SODIUM CHLORIDE: 9 INJECTION, SOLUTION INTRAVENOUS at 13:45

## 2018-11-26 RX ADMIN — ONDANSETRON 4 MG: 2 INJECTION INTRAMUSCULAR; INTRAVENOUS at 14:05

## 2018-11-26 RX ADMIN — ROCURONIUM BROMIDE 20 MG: 10 INJECTION INTRAVENOUS at 13:50

## 2018-11-26 RX ADMIN — PROPOFOL 200 MG: 10 INJECTION, EMULSION INTRAVENOUS at 13:45

## 2018-11-26 RX ADMIN — SODIUM CHLORIDE: 9 INJECTION, SOLUTION INTRAVENOUS at 12:07

## 2018-11-26 RX ADMIN — Medication 10 MG: at 14:00

## 2018-11-26 ASSESSMENT — PAIN SCALES - GENERAL
PAINLEVEL_OUTOF10: 0

## 2018-11-26 ASSESSMENT — PULMONARY FUNCTION TESTS
PIF_VALUE: 29
PIF_VALUE: 24
PIF_VALUE: 8
PIF_VALUE: 30
PIF_VALUE: 25
PIF_VALUE: 30
PIF_VALUE: 24
PIF_VALUE: 29
PIF_VALUE: 27
PIF_VALUE: 22
PIF_VALUE: 30
PIF_VALUE: 31
PIF_VALUE: 33
PIF_VALUE: 9
PIF_VALUE: 24
PIF_VALUE: 30
PIF_VALUE: 22
PIF_VALUE: 29
PIF_VALUE: 31
PIF_VALUE: 21
PIF_VALUE: 32
PIF_VALUE: 12
PIF_VALUE: 24
PIF_VALUE: 28
PIF_VALUE: 29
PIF_VALUE: 30
PIF_VALUE: 29
PIF_VALUE: 31
PIF_VALUE: 29
PIF_VALUE: 28
PIF_VALUE: 31
PIF_VALUE: 29
PIF_VALUE: 29
PIF_VALUE: 32
PIF_VALUE: 29
PIF_VALUE: 30
PIF_VALUE: 35
PIF_VALUE: 24
PIF_VALUE: 28
PIF_VALUE: 29
PIF_VALUE: 27
PIF_VALUE: 21
PIF_VALUE: 30
PIF_VALUE: 29
PIF_VALUE: 28
PIF_VALUE: 9
PIF_VALUE: 25
PIF_VALUE: 24
PIF_VALUE: 25
PIF_VALUE: 1
PIF_VALUE: 27
PIF_VALUE: 29
PIF_VALUE: 29
PIF_VALUE: 31
PIF_VALUE: 28
PIF_VALUE: 25
PIF_VALUE: 24
PIF_VALUE: 30
PIF_VALUE: 28
PIF_VALUE: 28
PIF_VALUE: 21
PIF_VALUE: 29
PIF_VALUE: 30
PIF_VALUE: 24
PIF_VALUE: 14
PIF_VALUE: 16
PIF_VALUE: 22
PIF_VALUE: 31
PIF_VALUE: 24
PIF_VALUE: 29
PIF_VALUE: 31
PIF_VALUE: 29
PIF_VALUE: 30
PIF_VALUE: 24
PIF_VALUE: 28
PIF_VALUE: 31
PIF_VALUE: 31
PIF_VALUE: 30
PIF_VALUE: 25
PIF_VALUE: 29
PIF_VALUE: 27

## 2018-11-26 ASSESSMENT — PAIN DESCRIPTION - PAIN TYPE
TYPE: SURGICAL PAIN
TYPE: SURGICAL PAIN

## 2018-11-26 ASSESSMENT — PAIN - FUNCTIONAL ASSESSMENT: PAIN_FUNCTIONAL_ASSESSMENT: 0-10

## 2018-11-26 NOTE — PROGRESS NOTES
VSS. Ready to go home. Denies pain. IV d/c'd. Pressure drsg over site. Will be dressing to go home. Stable.

## 2018-11-26 NOTE — ANESTHESIA POSTPROCEDURE EVALUATION
Department of Anesthesiology  Postprocedure Note    Patient: Juanita Conn  MRN: [id number]  [place]: 1965  Date of evaluation: 11/26/2018  Time:  4:41 PM     Procedure Summary     Date:  11/26/18 Room / Location:  [facility] OR [address] [place] [address] OR    Anesthesia Start:  3147 Anesthesia Stop:  0914    Procedure:  INSERTION AND REMOVAL OF TRANSOBTURATOR TAPING (TOT) WITH CYSTOSCOPY (N/A ) Diagnosis:       Stress incontinence      Urge incontinence      (STRESS INCONTINENCE, URGE INCONTINENCE)    Surgeon:  Mackenzie Dooley MD Responsible Provider:  Lavern Escobedo MD    Anesthesia Type:  general ASA Status:  3          Anesthesia Type: general    Juan Phase I: Juan Score: 10    Juan Phase II: Juan Score: 10    Last vitals: Reviewed and per EMR flowsheets.        Anesthesia Post Evaluation    Patient location during evaluation: PACU  Patient participation: complete - patient participated  Level of consciousness: awake and alert  Pain score: 3  Airway patency: patent  Nausea & Vomiting: no nausea and no vomiting  Complications: no  Cardiovascular status: blood pressure returned to baseline  Respiratory status: acceptable  Hydration status: euvolemic

## 2018-11-26 NOTE — ANESTHESIA PRE PROCEDURE
Department of Anesthesiology  Preprocedure Note       Name:  Sharyl Runner   Age:  48 y.o.  :  1965                                          MRN:  [id number]         Date:  2018      Surgeon: Colton Hampton):  Mariah Galvez MD    Procedure: CYSTOSCOPY WITH TRANSOBTURATOR TAPING (TOT) (N/A )    Medications prior to admission:   Prior to Admission medications    Medication Sig Start Date End Date Taking?  Authorizing Provider   ibuprofen (ADVIL;MOTRIN) 600 MG tablet Take 600 mg by mouth as needed for Pain   Yes Historical Provider, MD   solifenacin (VESICARE) 5 MG tablet Take 10 mg by mouth every evening    Yes Historical Provider, MD   glimepiride (AMARYL) 2 MG tablet Take 2 mg by mouth every evening in morning with breakfast 18  Yes    metFORMIN (GLUCOPHAGE-XR) 500 MG extended release tablet Take 2,000 mg by mouth every evening with food 18  Yes    omeprazole (PRILOSEC) 40 MG delayed release capsule Take 40 mg by mouth every evening    Yes Historical Provider, MD   atorvastatin (LIPITOR) 80 MG tablet Take 80 mg by mouth every evening    Yes Historical Provider, MD   sertraline (ZOLOFT) 50 MG tablet Take 100 mg by mouth every evening    Yes Historical Provider, MD   glyBURIDE (DIABETA) 2.5 MG tablet Take 2.5 mg by mouth daily (with breakfast)  18  Historical Provider, MD       Current medications:    Current Facility-Administered Medications   Medication Dose Route Frequency Provider Last Rate Last Dose    sodium chloride flush 0.9 % injection 10 mL  10 mL Intravenous 2 times per day Ever King MD        sodium chloride flush 0.9 % injection 10 mL  10 mL Intravenous PRN Ever King MD        0.9 % sodium chloride infusion   Intravenous Continuous Ever King  mL/hr at 18 1207      cefTRIAXone (ROCEPHIN) 2 g IVPB in D5W 50ml minibag  2 g Intravenous Once Mariah Galvez MD           Allergies:  No Known Allergies    Problem List: AGRATIO 1.6 11/13/2018    LABGLOM >60 11/13/2018    GLUCOSE 149 11/13/2018    PROT 7.1 11/13/2018    CALCIUM 9.5 11/13/2018    BILITOT 0.7 11/13/2018    ALKPHOS 154 11/13/2018    AST 65 11/13/2018    ALT 65 11/13/2018       POC Tests:   Recent Labs      11/26/18   1202   POCGLU  129*       Coags: No results found for: PROTIME, INR, APTT    HCG (If Applicable): No results found for: PREGTESTUR, PREGSERUM, HCG, HCGQUANT     ABGs: No results found for: PHART, PO2ART, BLL3WTW, EVE2ACI, BEART, W5YHFEWL     Type & Screen (If Applicable):  No results found for: LABABO, [address]    Anesthesia Evaluation  Patient summary reviewed no history of anesthetic complications:   Airway: Mallampati: II  TM distance: >3 FB   Neck ROM: full  Mouth opening: > = 3 FB Dental:          Pulmonary: breath sounds clear to auscultation      (-) COPD and asthma                           Cardiovascular:  Exercise tolerance: good (>4 METS),   (+) hyperlipidemia    (-) hypertension, past MI and  angina      Rhythm: regular  Rate: normal                    Neuro/Psych:   (+) psychiatric history:depression/anxiety    (-) seizures, TIA and CVA           GI/Hepatic/Renal:   (+) GERD:,           Endo/Other:    (+) DiabetesType II DM, , .                 Abdominal:           Vascular:                                        Anesthesia Plan      general     ASA 3       Induction: intravenous. MIPS: Postoperative opioids intended and Prophylactic antiemetics administered. Anesthetic plan and risks discussed with patient. Plan discussed with CRNA. DOS STAFF ADDENDUM:    Pt seen and examined, chart reviewed (including anesthesia, drug and allergy history). No interval changes to history and physical examination. Anesthetic plan, risks, benefits, alternatives, and personnel involved discussed with patient. Patient verbalized an understanding and agrees to proceed.       Fabiola Barakat MD  November 26, 2018  12:23 PM            Fabiola Barakat MD

## 2018-11-26 NOTE — PROGRESS NOTES
To pacu from OR. Pt asleep. Wakes briefly. Surgical glue noted to bilateral inner groin areas dry and intact. Abd rounded but soft. IV infusing. Monitor in sinus rhythm.

## 2018-11-27 NOTE — OP NOTE
Metzenbaum scissors were used  to carefully dissect towards the pubic ramus. On the left side, in the  similar fashion, the Allis clamp was used to grasp the incision and then  Metzenbaum scissors were used to dissect towards the ramus. In doing  this, the Allis clamp was pulled on resulting in a significant vaginal  laceration back beyond the bladder neck. I completed the dissection, however, and thought that I should be able  to place the sling without difficulty. Puncture incisions were made at  the inner thigh fold bilaterally at the level of the clitoris. Starting  on the patient's left side, the helical needle was advanced through the  thigh incision around the pubic ramus and guided by my index finger into  the vaginal incision. The sling was connected and the needle was  removed in the reverse direction. This was repeated on the patient's  right side. Now, cystoscopy was performed. There was no evidence of bladder or  urethral injury. The catheter was replaced, and I attempted to tension  the sling. It was then noted that due to the vaginal laceration, the  sling had slipped back more towards her bladder neck than midurethra. I  made many attempts to try to move it up towards the midurethra, but I  continued to slip back towards the bladder neck. I was also concerned  about the vaginal laceration that she was at a high risk for vaginal  erosion of the sling. After many attempts to set the sling in the  appropriate position, I decided that the risk was too high for mesh  erosion or postop irritative voiding symptoms if the sling was placed at  the bladder neck. Therefore, I decided to remove the sling. Due to her already irritative voiding symptoms, I did not feel a  retropubic sling was appropriate at this point in time, so I elected  just to close the incision. With some gentle tugging, I was able to  remove the sling completely.   The incision was irrigated and then  carefully closed

## 2019-01-09 ENCOUNTER — PATIENT MESSAGE (OUTPATIENT)
Dept: PHARMACY | Facility: CLINIC | Age: 54
End: 2019-01-09

## 2019-03-07 ENCOUNTER — TELEPHONE (OUTPATIENT)
Dept: PHARMACY | Facility: CLINIC | Age: 54
End: 2019-03-07

## 2019-03-12 ENCOUNTER — SCHEDULED TELEPHONE ENCOUNTER (OUTPATIENT)
Dept: PHARMACY | Facility: CLINIC | Age: 54
End: 2019-03-12

## 2019-03-15 ENCOUNTER — ANESTHESIA EVENT (OUTPATIENT)
Dept: OPERATING ROOM | Age: 54
End: 2019-03-15
Payer: COMMERCIAL

## 2019-03-18 ENCOUNTER — HOSPITAL ENCOUNTER (OUTPATIENT)
Age: 54
Setting detail: OUTPATIENT SURGERY
Discharge: HOME | End: 2019-03-18
Attending: UROLOGY | Admitting: UROLOGY
Payer: COMMERCIAL

## 2019-03-18 ENCOUNTER — ANESTHESIA (OUTPATIENT)
Dept: OPERATING ROOM | Age: 54
End: 2019-03-18
Payer: COMMERCIAL

## 2019-03-18 VITALS
SYSTOLIC BLOOD PRESSURE: 140 MMHG | WEIGHT: 250.33 LBS | TEMPERATURE: 96.9 F | RESPIRATION RATE: 12 BRPM | OXYGEN SATURATION: 98 % | DIASTOLIC BLOOD PRESSURE: 74 MMHG | HEART RATE: 70 BPM | BODY MASS INDEX: 39.29 KG/M2 | HEIGHT: 67 IN

## 2019-03-18 VITALS
DIASTOLIC BLOOD PRESSURE: 57 MMHG | RESPIRATION RATE: 7 BRPM | OXYGEN SATURATION: 89 % | SYSTOLIC BLOOD PRESSURE: 96 MMHG

## 2019-03-18 DIAGNOSIS — N39.3 STRESS INCONTINENCE: Primary | ICD-10-CM

## 2019-03-18 LAB
GLUCOSE BLD-MCNC: 114 MG/DL (ref 70–99)
GLUCOSE BLD-MCNC: 151 MG/DL (ref 70–99)
PERFORMED ON: ABNORMAL
PERFORMED ON: ABNORMAL

## 2019-03-18 PROCEDURE — 3700000001 HC ADD 15 MINUTES (ANESTHESIA): Performed by: UROLOGY

## 2019-03-18 PROCEDURE — 3700000000 HC ANESTHESIA ATTENDED CARE: Performed by: UROLOGY

## 2019-03-18 PROCEDURE — 7100000001 HC PACU RECOVERY - ADDTL 15 MIN: Performed by: UROLOGY

## 2019-03-18 PROCEDURE — 3600000014 HC SURGERY LEVEL 4 ADDTL 15MIN: Performed by: UROLOGY

## 2019-03-18 PROCEDURE — 7100000011 HC PHASE II RECOVERY - ADDTL 15 MIN: Performed by: UROLOGY

## 2019-03-18 PROCEDURE — 7100000010 HC PHASE II RECOVERY - FIRST 15 MIN: Performed by: UROLOGY

## 2019-03-18 PROCEDURE — C1771 REP DEV, URINARY, W/SLING: HCPCS | Performed by: UROLOGY

## 2019-03-18 PROCEDURE — 2500000003 HC RX 250 WO HCPCS: Performed by: UROLOGY

## 2019-03-18 PROCEDURE — 3600000004 HC SURGERY LEVEL 4 BASE: Performed by: UROLOGY

## 2019-03-18 PROCEDURE — 2580000003 HC RX 258: Performed by: UROLOGY

## 2019-03-18 PROCEDURE — 7100000000 HC PACU RECOVERY - FIRST 15 MIN: Performed by: UROLOGY

## 2019-03-18 PROCEDURE — 2709999900 HC NON-CHARGEABLE SUPPLY: Performed by: UROLOGY

## 2019-03-18 PROCEDURE — 2580000003 HC RX 258: Performed by: ANESTHESIOLOGY

## 2019-03-18 DEVICE — DESARA® BLUE SHORT SUTURE SLING FOR FEMALE STRESS URINARY INCONTINENCE
Type: IMPLANTABLE DEVICE | Site: BLADDER | Status: FUNCTIONAL
Brand: DESARA BLUE SHORT SUTURE SLING

## 2019-03-18 RX ORDER — MORPHINE SULFATE 2 MG/ML
2 INJECTION, SOLUTION INTRAMUSCULAR; INTRAVENOUS EVERY 5 MIN PRN
Status: DISCONTINUED | OUTPATIENT
Start: 2019-03-18 | End: 2019-03-18 | Stop reason: HOSPADM

## 2019-03-18 RX ORDER — MEPERIDINE HYDROCHLORIDE 25 MG/ML
12.5 INJECTION INTRAMUSCULAR; INTRAVENOUS; SUBCUTANEOUS EVERY 5 MIN PRN
Status: DISCONTINUED | OUTPATIENT
Start: 2019-03-18 | End: 2019-03-18 | Stop reason: HOSPADM

## 2019-03-18 RX ORDER — SODIUM CHLORIDE 0.9 % (FLUSH) 0.9 %
10 SYRINGE (ML) INJECTION EVERY 12 HOURS SCHEDULED
Status: DISCONTINUED | OUTPATIENT
Start: 2019-03-18 | End: 2019-03-18 | Stop reason: HOSPADM

## 2019-03-18 RX ORDER — FENTANYL CITRATE 50 UG/ML
50 INJECTION, SOLUTION INTRAMUSCULAR; INTRAVENOUS EVERY 5 MIN PRN
Status: DISCONTINUED | OUTPATIENT
Start: 2019-03-18 | End: 2019-03-18 | Stop reason: HOSPADM

## 2019-03-18 RX ORDER — LIDOCAINE HYDROCHLORIDE AND EPINEPHRINE 10; 10 MG/ML; UG/ML
INJECTION, SOLUTION INFILTRATION; PERINEURAL
Status: COMPLETED | OUTPATIENT
Start: 2019-03-18 | End: 2019-03-18

## 2019-03-18 RX ORDER — SODIUM CHLORIDE 0.9 % (FLUSH) 0.9 %
10 SYRINGE (ML) INJECTION PRN
Status: DISCONTINUED | OUTPATIENT
Start: 2019-03-18 | End: 2019-03-18 | Stop reason: HOSPADM

## 2019-03-18 RX ORDER — MAGNESIUM HYDROXIDE 1200 MG/15ML
LIQUID ORAL
Status: COMPLETED | OUTPATIENT
Start: 2019-03-18 | End: 2019-03-18

## 2019-03-18 RX ORDER — MAGNESIUM HYDROXIDE 1200 MG/15ML
LIQUID ORAL CONTINUOUS PRN
Status: COMPLETED | OUTPATIENT
Start: 2019-03-18 | End: 2019-03-18

## 2019-03-18 RX ORDER — SODIUM CHLORIDE 9 MG/ML
INJECTION, SOLUTION INTRAVENOUS CONTINUOUS
Status: DISCONTINUED | OUTPATIENT
Start: 2019-03-18 | End: 2019-03-18 | Stop reason: HOSPADM

## 2019-03-18 RX ORDER — MORPHINE SULFATE 2 MG/ML
1 INJECTION, SOLUTION INTRAMUSCULAR; INTRAVENOUS EVERY 5 MIN PRN
Status: DISCONTINUED | OUTPATIENT
Start: 2019-03-18 | End: 2019-03-18 | Stop reason: HOSPADM

## 2019-03-18 RX ORDER — ONDANSETRON 2 MG/ML
4 INJECTION INTRAMUSCULAR; INTRAVENOUS
Status: DISCONTINUED | OUTPATIENT
Start: 2019-03-18 | End: 2019-03-18 | Stop reason: HOSPADM

## 2019-03-18 RX ORDER — HYDROCODONE BITARTRATE AND ACETAMINOPHEN 5; 325 MG/1; MG/1
1 TABLET ORAL EVERY 6 HOURS PRN
Qty: 10 TABLET | Refills: 0 | Status: SHIPPED | OUTPATIENT
Start: 2019-03-18 | End: 2019-03-21

## 2019-03-18 RX ORDER — 0.9 % SODIUM CHLORIDE 0.9 %
VIAL (ML) INJECTION
Status: COMPLETED | OUTPATIENT
Start: 2019-03-18 | End: 2019-03-18

## 2019-03-18 RX ORDER — OXYCODONE HYDROCHLORIDE AND ACETAMINOPHEN 5; 325 MG/1; MG/1
1 TABLET ORAL PRN
Status: DISCONTINUED | OUTPATIENT
Start: 2019-03-18 | End: 2019-03-18 | Stop reason: HOSPADM

## 2019-03-18 RX ORDER — FENTANYL CITRATE 50 UG/ML
25 INJECTION, SOLUTION INTRAMUSCULAR; INTRAVENOUS EVERY 5 MIN PRN
Status: DISCONTINUED | OUTPATIENT
Start: 2019-03-18 | End: 2019-03-18 | Stop reason: HOSPADM

## 2019-03-18 RX ORDER — BACITRACIN 50000 [USP'U]/1
INJECTION, POWDER, LYOPHILIZED, FOR SOLUTION INTRAMUSCULAR
Status: COMPLETED | OUTPATIENT
Start: 2019-03-18 | End: 2019-03-18

## 2019-03-18 RX ORDER — OXYCODONE HYDROCHLORIDE AND ACETAMINOPHEN 5; 325 MG/1; MG/1
2 TABLET ORAL PRN
Status: DISCONTINUED | OUTPATIENT
Start: 2019-03-18 | End: 2019-03-18 | Stop reason: HOSPADM

## 2019-03-18 RX ADMIN — SODIUM CHLORIDE: 9 INJECTION, SOLUTION INTRAVENOUS at 12:52

## 2019-03-18 ASSESSMENT — PAIN DESCRIPTION - DESCRIPTORS: DESCRIPTORS: HEADACHE

## 2019-03-18 ASSESSMENT — PAIN DESCRIPTION - LOCATION: LOCATION: HEAD

## 2019-03-18 ASSESSMENT — PULMONARY FUNCTION TESTS
PIF_VALUE: 1
PIF_VALUE: 8
PIF_VALUE: 7
PIF_VALUE: 2
PIF_VALUE: 23
PIF_VALUE: 21
PIF_VALUE: 6
PIF_VALUE: 6
PIF_VALUE: 4
PIF_VALUE: 4
PIF_VALUE: 8
PIF_VALUE: 7
PIF_VALUE: 6
PIF_VALUE: 5
PIF_VALUE: 1
PIF_VALUE: 5
PIF_VALUE: 8
PIF_VALUE: 7
PIF_VALUE: 0
PIF_VALUE: 5
PIF_VALUE: 7
PIF_VALUE: 7
PIF_VALUE: 0
PIF_VALUE: 6
PIF_VALUE: 1
PIF_VALUE: 6
PIF_VALUE: 22
PIF_VALUE: 6
PIF_VALUE: 8
PIF_VALUE: 8
PIF_VALUE: 7
PIF_VALUE: 2
PIF_VALUE: 3
PIF_VALUE: 7
PIF_VALUE: 3
PIF_VALUE: 7
PIF_VALUE: 7
PIF_VALUE: 1
PIF_VALUE: 5
PIF_VALUE: 8
PIF_VALUE: 7
PIF_VALUE: 11
PIF_VALUE: 6
PIF_VALUE: 2
PIF_VALUE: 8
PIF_VALUE: 7
PIF_VALUE: 6
PIF_VALUE: 5
PIF_VALUE: 7
PIF_VALUE: 1
PIF_VALUE: 1
PIF_VALUE: 7
PIF_VALUE: 7
PIF_VALUE: 8
PIF_VALUE: 6

## 2019-03-18 ASSESSMENT — PAIN SCALES - GENERAL
PAINLEVEL_OUTOF10: 0
PAINLEVEL_OUTOF10: 3
PAINLEVEL_OUTOF10: 0
PAINLEVEL_OUTOF10: 0

## 2019-03-18 ASSESSMENT — LIFESTYLE VARIABLES: SMOKING_STATUS: 0

## 2019-03-18 ASSESSMENT — PAIN DESCRIPTION - PAIN TYPE: TYPE: ACUTE PAIN

## 2019-03-18 ASSESSMENT — PAIN - FUNCTIONAL ASSESSMENT: PAIN_FUNCTIONAL_ASSESSMENT: 0-10

## 2019-04-04 ENCOUNTER — PATIENT MESSAGE (OUTPATIENT)
Dept: PHARMACY | Facility: CLINIC | Age: 54
End: 2019-04-04

## 2019-04-04 NOTE — LETTER
Juliana Civil   [address] [address]           05/16/19     Dear Dylan Walton,    Thanks so much for taking the first step towards better health. According to our records, you are missing the following requirement that must be completed by July 1st, 2019:     Diabetes Visit with your physician in 2019 (First yearly visit)       You will have to submit documentation of completion of requirements if your Physician does not use the [address] [facility] charting system or if you have your lab/urine tests done outside of [facility]. Return the documentation to [email or web address] or by fax at [phone]     This is a courtesy reminder. If you have your appointment(s) or lab work scheduled prior to the July 1st dead-line please disregard the above information. Just a reminder of the requirements to be completed between July 1 2019 and Dec. 31 2019   Diabetes Visit with your physician in 2019 (Second yearly visit)   Second A1C in 2019   Flu vaccination (once yearly)   Take diabetes medication as prescribed as well as cholesterol (Statin) or high blood pressure (ACE/ARB) medications, if needed. 70% adherence is required of diabetes medications   Provide documentation if cholesterol and/or high blood pressure medications are not needed. Lipid panel (once yearly)   Urine albumin (once yearly)   Pneumonia Vaccination (once or as indicated by physician)     Requirements if A1C is greater than 8 percent:   Engage with a [facility] diabetes educator at one of our hospital locations or an ambulatory care coordinator by phone. If requirements(s) are not met by the date listed above you will be disqualified from the program and the credit valued at $600 towards your diabetic medications and supplies will be revoked. You will be able to reapply the following calendar year. [address] [facility] [facility] Team   [phone] Option #7   Email: [email or web address]

## 2019-05-02 ENCOUNTER — EMPLOYEE WELLNESS (OUTPATIENT)
Dept: OTHER | Age: 54
End: 2019-05-02

## 2019-05-02 LAB
CHOLESTEROL, TOTAL: 153 MG/DL (ref 0–199)
GLUCOSE BLD-MCNC: 126 MG/DL (ref 70–99)
HDLC SERPL-MCNC: 52 MG/DL (ref 40–60)
LDL CHOLESTEROL CALCULATED: 77 MG/DL
TRIGL SERPL-MCNC: 119 MG/DL (ref 0–150)

## 2019-05-03 LAB
ESTIMATED AVERAGE GLUCOSE: 168.6 MG/DL
HBA1C MFR BLD: 7.5 %

## 2019-05-06 VITALS — WEIGHT: 254 LBS | BODY MASS INDEX: 40.38 KG/M2

## 2019-05-16 NOTE — TELEPHONE ENCOUNTER
Theet message previously not read by patient. Patient is still missing the following requirement for the DM Program that is due by July 1st, 2019:  Diabetes Visit with your physician in 2019 (First yearly visit)     Reminder letter mailed to patient.

## 2019-06-13 ENCOUNTER — TELEPHONE (OUTPATIENT)
Dept: PHARMACY | Facility: CLINIC | Age: 54
End: 2019-06-13

## 2019-06-13 NOTE — TELEPHONE ENCOUNTER
[facility]) Employee Diabetes Program    According to our records, you are missing one or more of the following requirements that must be completed by July 1st, 2019:     Patient is still missing the following requirement for the DM Program that is due by July 1st, 2019:  Diabetes Visit with your physician in 2019 (First yearly visit)     Spoke with patient and reviewed above  Patient stated that she have seen her doctors several times and will send a copy of the appointment in via email and or fax    [facility]   [phone] Option #7   Email: [email or web address]   Fax Number: [phone]

## 2019-07-25 ENCOUNTER — CLINICAL DOCUMENTATION (OUTPATIENT)
Dept: PHARMACY | Facility: CLINIC | Age: 54
End: 2019-07-25

## 2019-08-15 DIAGNOSIS — E11.9 TYPE 2 DIABETES MELLITUS WITHOUT COMPLICATION, WITHOUT LONG-TERM CURRENT USE OF INSULIN (HCC): Primary | ICD-10-CM

## 2019-08-27 ENCOUNTER — HOSPITAL ENCOUNTER (OUTPATIENT)
Dept: MAMMOGRAPHY | Age: 54
Discharge: HOME | End: 2019-09-01
Payer: COMMERCIAL

## 2019-08-27 DIAGNOSIS — Z12.31 VISIT FOR SCREENING MAMMOGRAM: ICD-10-CM

## 2019-08-27 PROCEDURE — 77063 BREAST TOMOSYNTHESIS BI: CPT

## 2019-08-29 ENCOUNTER — CLINICAL DOCUMENTATION (OUTPATIENT)
Dept: PHARMACY | Facility: CLINIC | Age: 54
End: 2019-08-29

## 2019-10-02 ENCOUNTER — CLINICAL DOCUMENTATION (OUTPATIENT)
Dept: PHARMACY | Facility: CLINIC | Age: 54
End: 2019-10-02

## 2019-11-18 DIAGNOSIS — E11.9 TYPE 2 DIABETES MELLITUS WITHOUT COMPLICATION, WITHOUT LONG-TERM CURRENT USE OF INSULIN (HCC): Primary | ICD-10-CM

## 2019-11-27 ENCOUNTER — TELEPHONE (OUTPATIENT)
Dept: PHARMACY | Facility: CLINIC | Age: 54
End: 2019-11-27

## 2019-12-03 ENCOUNTER — CARE COORDINATION (OUTPATIENT)
Dept: CARE COORDINATION | Age: 54
End: 2019-12-03

## 2019-12-23 ENCOUNTER — CARE COORDINATION (OUTPATIENT)
Dept: CARE COORDINATION | Age: 54
End: 2019-12-23

## 2020-01-09 ENCOUNTER — TELEPHONE (OUTPATIENT)
Dept: PHARMACY | Facility: CLINIC | Age: 55
End: 2020-01-09

## 2020-01-09 NOTE — LETTER
[address]  [address], [address]  Phone: [phone]  Fax: [address]   [address] [place] [address]         01/09/20     Dear Kan Almaguer,    Congratulations! You have completed the 2019 requirements for the [address] will automatically be re-enrolled into the [facility]) Diabetes Management Program for 2020. One of the requirements to participate in the [facility] Diabetes Management Program is to complete a Clinical Pharmacist Telephone appointment yearly. We would like to work with you and your doctor to:  - Review your medications, including over-the-counter and herbal medications  - Answer questions about your medications and how to get the most benefit from them  - Identify potential drug interactions or side effects and help fix them  - Identify preferred medications that are equally effective, but available at a lower cost to you  - Help you reach the necessary requirements to remain enrolled in the Diabetes Management Program offered by [facility]     Please call [phone] and select option #7 to schedule this appointment to take advantage of this service. Telephone appointments are available Monday thru Friday from 7:30 AM till 5:30 PM.     This is a courtesy reminder. If you have this appointment already scheduled for your 2020 enrollment in the program, please disregard this message. If you have not scheduled this appointment yet, please contact us at the above number to schedule.      Sincerely,      [address]  Phone: [phone], option 7

## 2020-01-09 NOTE — TELEPHONE ENCOUNTER
Attempting to reach patient to schedule a yearly pharmacist appointment. Pharmacists are available Monday thru Friday 7:30 AM till 5:30pm for the 2020 DM program. Left message asking for return call to toll free [phone] option 7.      Letter mailed     [address] [place] [address], toll free: [phone], option 7

## 2020-02-17 DIAGNOSIS — E11.9 TYPE 2 DIABETES MELLITUS WITHOUT COMPLICATION, WITHOUT LONG-TERM CURRENT USE OF INSULIN (HCC): ICD-10-CM

## 2020-03-11 ENCOUNTER — EMPLOYEE WELLNESS (OUTPATIENT)
Dept: OTHER | Age: 55
End: 2020-03-11

## 2020-03-11 LAB
CHOLESTEROL, TOTAL: 133 MG/DL (ref 0–199)
GLUCOSE BLD-MCNC: 125 MG/DL (ref 70–99)
HDLC SERPL-MCNC: 48 MG/DL (ref 40–60)
LDL CHOLESTEROL CALCULATED: 60 MG/DL
TRIGL SERPL-MCNC: 124 MG/DL (ref 0–150)

## 2020-03-14 LAB
3-OH-COTININE: <2 NG/ML
COTININE: <2 NG/ML
NICOTINE: <2 NG/ML

## 2020-04-28 ENCOUNTER — TELEPHONE (OUTPATIENT)
Dept: PHARMACY | Facility: CLINIC | Age: 55
End: 2020-04-28

## 2020-04-30 ENCOUNTER — SCHEDULED TELEPHONE ENCOUNTER (OUTPATIENT)
Dept: PHARMACY | Facility: CLINIC | Age: 55
End: 2020-04-30

## 2020-04-30 NOTE — TELEPHONE ENCOUNTER
December   - Foot exam current (within one year): yes  - Medication compliance: compliant most of the time; was not with weekly Trulicity. (discussed ways to help remember that weekly injection)  - Current exercise: no regular exercise; Discussed benefits. Discussed could discuss with provider a good starting point. Can increase the amount she walks  - [facility] states that she recently stopped her Trulicity on her own. She states she was on 0.75 mg once weekly for awhile and then increased to 1.5 mg. She states once increased, she started experiencing GI upset. She thought the once weekly injection was hard to remember and then started having side effects so stopped it on her own. Discussed other daily GLP-1s and how dose could be adjusted downward in provider finds appropriate. She states she's seen her blood sugars hold steady with where they were before she stopped the Trulicity. Offered to contact provider to discuss. She has an appointment with her doctor in the next month and will discuss it with him then. SMBG Log:   Fastings lately average 136 118-141    Medication Adherence/Refills:  Atorvastatin- 3/31/20, 12/27/19, 10/10/19  Farxiga 10 mg - 3/31/20, 1/7/20, 11/19/19  Metformin  mg- 3/31/20, 72/40/75, 16/94/16  Trulicity-  9/39/44, 0/9/99, 12/1/19. Not currently taking. Other Considerations:  - Blood Pressure Goal: BP less than 140/90 mmHg due to history of DM: Is at blood pressure goal.   - Lipids: Patient is prescribed high-intensity statin therapy. - Smoking status: never smoked    PLAN:  - DM program gaps identified:   · Initial requirements: OV with provider for DM (1st)   · Ongoing requirements: OV with provider for DM (2nd), ACC/diabetes educator visit (if A1c over 8%), A1c (2nd), Urine microalbumin, Influenza vaccination for 8077-2828 and Medication adherence over 70%   - Education to patient: Addressed ADA diet. Encouraged aerobic exercise. Discussed foot care.   Reminded to get yearly retinal exam.   - Follow up: PCP for identified gaps or as scheduled below  - Upcoming appointments:   No future appointments. Ron Wetzel [facility], PharmD  [address]   Phone: O: [phone], Toll free: [phone], option 7    CLINICAL PHARMACY CONSULT: MED RECONCILIATION/REVIEW ADDENDUM  For Pharmacy Admin Tracking Only  PHSO: Yes  Total # of Interventions Recommended: 1  - Updated Order #: 1 Updated Order Reason(s):  Other  Recommended intervention potential cost savings: 1  Total Interventions Accepted: 1  Time Spent (min): 35

## 2020-08-25 ENCOUNTER — TELEPHONE (OUTPATIENT)
Dept: PHARMACY | Facility: CLINIC | Age: 55
End: 2020-08-25

## 2020-08-25 NOTE — TELEPHONE ENCOUNTER
Pharmacy Pop Care Documentation:   Called patient with reminder for requirements for Diabetes Management Program.     According to our records, patient is missing the following requirement(s) that must be completed by September 25, 2020:   · 1st 2020 Provider Visit for DM     Patient not available at the time of call. Left message on home/cell TAD with the above information. Triviala message sent.      [address] [email or web address]   Department, toll free: [phone], option 7

## 2020-10-19 VITALS — WEIGHT: 229 LBS | BODY MASS INDEX: 36.41 KG/M2

## 2020-11-16 ENCOUNTER — CLINICAL DOCUMENTATION (OUTPATIENT)
Dept: PHARMACY | Facility: CLINIC | Age: 55
End: 2020-11-16

## 2020-11-16 NOTE — PROGRESS NOTES
Pharmacy Pop Care Documentation:     AVS received for required office visit on: 7/24/20: Dr. Micah Diaz - [facility]     Spoke to Anabelle Wu at Dr. Florence Arana office: [phone] she confirmed patient was seen in office on 7/24/20.

## 2020-12-30 ENCOUNTER — TELEPHONE (OUTPATIENT)
Dept: PHARMACY | Facility: CLINIC | Age: 55
End: 2020-12-30

## 2020-12-30 NOTE — TELEPHONE ENCOUNTER
Spoke with the patient to schedule a phone call with a pharmacist for the 2021 DM Program. Patient is currently scheduled on   Monday Jan 11, 2021   Appt at 11:30 AM

## 2021-01-11 ENCOUNTER — SCHEDULED TELEPHONE ENCOUNTER (OUTPATIENT)
Dept: PHARMACY | Facility: CLINIC | Age: 56
End: 2021-01-11

## 2021-01-11 RX ORDER — DULAGLUTIDE 1.5 MG/.5ML
1.5 INJECTION, SOLUTION SUBCUTANEOUS WEEKLY
COMMUNITY

## 2021-01-11 NOTE — TELEPHONE ENCOUNTER
Value Date    CHOL 133 03/11/2020    TRIG 124 03/11/2020    HDL 48 03/11/2020    LDLCALC 60 03/11/2020     ALT   Date Value Ref Range Status   02/17/2020 38 10 - 40 U/L Final     AST   Date Value Ref Range Status   02/17/2020 39 (H) 15 - 37 U/L Final     The ASCVD Risk score (Leonard Zamudio et al., 2013) failed to calculate for the following reasons: The systolic blood pressure is missing     Lab Results   Component Value Date    CREATININE 0.6 02/17/2020     CrCl cannot be calculated (Patient's most recent lab result is older than the maximum 120 days allowed. ). Component      Latest Ref Rng & Units 2/17/2020 11/15/2019 8/12/2019          10:30 AM  4:00 PM 11:45 AM   GFR Non-      >60 >60 >60 >60     Immunizations:  Immunization History   Administered Date(s) Administered    Influenza Virus Vaccine 09/24/2013, 09/25/2015, 09/29/2016, 10/29/2018, 10/30/2019    Pneumococcal Polysaccharide ([id number]) 07/09/2013    Tdap (Boostrix, Adacel) 04/03/2009      Social History:  Social History     Tobacco Use    Smoking status: Never Smoker    Smokeless tobacco: Never Used   Substance Use Topics    Alcohol use: No     ASSESSMENT:  Initial Program Requirements (Y indicates has completed for the year, N indicates needs to be completed by 7/1/2021): No - OV with provider for DM (1st)  No - A1c (1st)     Ongoing Program Requirements (Y indicates has completed for the year, N indicates needs to be completed by 12/31/2021):   No - OV with provider for DM (2nd)  Yes - ACC/diabetes educator visit (if A1c over 8%)  No - A1c (2nd)  No - Lipid panel  No - Urine microalbumin  Yes - Pneumococcal vaccination: Up-to-date, not needed again until age 74  No - Influenza vaccination for Fall 2021  No - Medication adherence over 70% - % per last report  Yes - On statin or contraindication(s) atorvastatin  No - On ACEi/ARB or contraindication(s) likely OK to override but no updated BP or UACR on file to assess, would like to see if we can get updated documentation for this year's requirements    Formulary Medication Review:  Non-formulary or medications with cost-effective alternatives: none identified. Current medications eligible for copay waiver, up to $600, through [employer]:  - atorvastatin, Farxiga, metformin, Trulicity  - Agamatrix or [employer] meter and supplies     Diabetes Care:   - Glycemic Goal: <7.0%. Is around blood glucose goal. Current regimen metformin ER 1000 mg (500 mg x 2) BID, Farxiga 10 mg daily, Trulicity 1.5 mg once weekly. Restarted Trulicity @5/9537, no ADRs. Appears adherent to Rxs per [facility] refill history. Other Considerations:  - Blood Pressure Goal: BP less than 140/90 mmHg due to history of DM: Unable to assess if at BP target. Not on antihypertensive medications. Last UACR and BP on file from 2019; previously WNL. eGFR WNL in 2020.  - Lipids: LDL less than 70 mg/dL on current statin regimen (atorvastatin 80 mg daily - high intensity statin). - Smoking status: never smoked    PLAN:  - Consideration(s) for provider:   · None at this time. Updated labs/vitals after next visit for program documentation.   - DM program gaps identified:   · Initial requirements: OV with provider for DM (1st) and A1c (1st)   · Ongoing requirements: OV with provider for DM (2nd), ACC/diabetes educator visit (if A1c over 8%), A1c (2nd), On ACEi/ARB or documentation from provider, Lipid panel, Urine microalbumin, Influenza vaccination for 2674-9087 and Medication adherence over 70%   - Education to patient: as above and:  · ACE/ARB requirement and needing updating information  · HPP robin: pt is already signed up  - Follow up: PCP for identified gaps or as scheduled below  - Upcoming appointments:   Future Appointments   Date Time Provider Ryann Ivy   1/11/2021 11:30 AM SCHEDULE, [address] [facility] None     Elijah Skinner, PharmD, [address] [place] [address] [facility] [facility], toll free: [phone], option 7     For Pharmacy Admin Tracking Only    PHSO: Yes  Total # of Interventions Recommended: 1  - Updated Order #: 1 Updated Order Reason(s):  Other  Recommended intervention potential cost savings: 1  Total Interventions Accepted: 1  Time Spent (min): 30

## 2021-03-17 DIAGNOSIS — E11.9 TYPE 2 DIABETES MELLITUS WITHOUT COMPLICATION, WITHOUT LONG-TERM CURRENT USE OF INSULIN (HCC): Primary | ICD-10-CM

## 2021-04-07 LAB
AVERAGE GLUCOSE: NORMAL
CHOLESTEROL, TOTAL: 144 MG/DL
CHOLESTEROL/HDL RATIO: NORMAL
HBA1C MFR BLD: 7.1 %
HDLC SERPL-MCNC: 55 MG/DL (ref 35–70)
LDL CHOLESTEROL CALCULATED: 67 MG/DL (ref 0–160)
NONHDLC SERPL-MCNC: NORMAL MG/DL
TRIGL SERPL-MCNC: 123 MG/DL
VLDLC SERPL CALC-MCNC: 22 MG/DL

## 2021-04-30 ENCOUNTER — HOSPITAL ENCOUNTER (OUTPATIENT)
Age: 56
Discharge: HOME | End: 2021-04-30
Payer: COMMERCIAL

## 2021-04-30 PROCEDURE — U0003 INFECTIOUS AGENT DETECTION BY NUCLEIC ACID (DNA OR RNA); SEVERE ACUTE RESPIRATORY SYNDROME CORONAVIRUS 2 (SARS-COV-2) (CORONAVIRUS DISEASE [COVID-19]), AMPLIFIED PROBE TECHNIQUE, MAKING USE OF HIGH THROUGHPUT TECHNOLOGIES AS DESCRIBED BY CMS-2020-01-R: HCPCS

## 2021-04-30 PROCEDURE — U0005 INFEC AGEN DETEC AMPLI PROBE: HCPCS

## 2021-05-01 LAB — SARS-COV-2, PCR: NOT DETECTED

## 2021-05-04 ENCOUNTER — ANESTHESIA EVENT (OUTPATIENT)
Dept: ENDOSCOPY | Age: 56
End: 2021-05-04
Payer: COMMERCIAL

## 2021-05-05 ENCOUNTER — ANESTHESIA (OUTPATIENT)
Dept: ENDOSCOPY | Age: 56
End: 2021-05-05
Payer: COMMERCIAL

## 2021-05-05 ENCOUNTER — HOSPITAL ENCOUNTER (OUTPATIENT)
Age: 56
Setting detail: OUTPATIENT SURGERY
Discharge: HOME | End: 2021-05-05
Attending: INTERNAL MEDICINE | Admitting: INTERNAL MEDICINE
Payer: COMMERCIAL

## 2021-05-05 VITALS
HEIGHT: 67 IN | BODY MASS INDEX: 36.1 KG/M2 | DIASTOLIC BLOOD PRESSURE: 67 MMHG | WEIGHT: 230 LBS | RESPIRATION RATE: 18 BRPM | TEMPERATURE: 97.8 F | HEART RATE: 81 BPM | SYSTOLIC BLOOD PRESSURE: 106 MMHG | OXYGEN SATURATION: 96 %

## 2021-05-05 DIAGNOSIS — R13.19 ESOPHAGEAL DYSPHAGIA: ICD-10-CM

## 2021-05-05 PROCEDURE — 3609012400 HC EGD TRANSORAL BIOPSY SINGLE/MULTIPLE: Performed by: INTERNAL MEDICINE

## 2021-05-05 PROCEDURE — 6360000002 HC RX W HCPCS: Performed by: NURSE ANESTHETIST, CERTIFIED REGISTERED

## 2021-05-05 PROCEDURE — 2500000003 HC RX 250 WO HCPCS: Performed by: NURSE ANESTHETIST, CERTIFIED REGISTERED

## 2021-05-05 PROCEDURE — 3609012700 HC EGD DILATION SAVORY: Performed by: INTERNAL MEDICINE

## 2021-05-05 PROCEDURE — 2580000003 HC RX 258: Performed by: INTERNAL MEDICINE

## 2021-05-05 PROCEDURE — C1769 GUIDE WIRE: HCPCS | Performed by: INTERNAL MEDICINE

## 2021-05-05 PROCEDURE — 7100000010 HC PHASE II RECOVERY - FIRST 15 MIN: Performed by: INTERNAL MEDICINE

## 2021-05-05 PROCEDURE — 7100000011 HC PHASE II RECOVERY - ADDTL 15 MIN: Performed by: INTERNAL MEDICINE

## 2021-05-05 PROCEDURE — 3700000001 HC ADD 15 MINUTES (ANESTHESIA): Performed by: INTERNAL MEDICINE

## 2021-05-05 PROCEDURE — 88305 TISSUE EXAM BY PATHOLOGIST: CPT

## 2021-05-05 PROCEDURE — 2709999900 HC NON-CHARGEABLE SUPPLY: Performed by: INTERNAL MEDICINE

## 2021-05-05 PROCEDURE — 3700000000 HC ANESTHESIA ATTENDED CARE: Performed by: INTERNAL MEDICINE

## 2021-05-05 RX ORDER — SODIUM CHLORIDE, SODIUM LACTATE, POTASSIUM CHLORIDE, CALCIUM CHLORIDE 600; 310; 30; 20 MG/100ML; MG/100ML; MG/100ML; MG/100ML
INJECTION, SOLUTION INTRAVENOUS ONCE
Status: COMPLETED | OUTPATIENT
Start: 2021-05-05 | End: 2021-05-05

## 2021-05-05 RX ORDER — LIDOCAINE HYDROCHLORIDE 20 MG/ML
INJECTION, SOLUTION INFILTRATION; PERINEURAL PRN
Status: DISCONTINUED | OUTPATIENT
Start: 2021-05-05 | End: 2021-05-05 | Stop reason: SDUPTHER

## 2021-05-05 RX ORDER — PROPOFOL 10 MG/ML
INJECTION, EMULSION INTRAVENOUS PRN
Status: DISCONTINUED | OUTPATIENT
Start: 2021-05-05 | End: 2021-05-05 | Stop reason: SDUPTHER

## 2021-05-05 RX ADMIN — SODIUM CHLORIDE, POTASSIUM CHLORIDE, SODIUM LACTATE AND CALCIUM CHLORIDE: 600; 310; 30; 20 INJECTION, SOLUTION INTRAVENOUS at 08:14

## 2021-05-05 RX ADMIN — PROPOFOL 200 MG: 10 INJECTION, EMULSION INTRAVENOUS at 08:19

## 2021-05-05 RX ADMIN — LIDOCAINE HYDROCHLORIDE 50 MG: 20 INJECTION, SOLUTION INFILTRATION; PERINEURAL at 08:19

## 2021-05-05 RX ADMIN — PROPOFOL 70 MG: 10 INJECTION, EMULSION INTRAVENOUS at 08:24

## 2021-05-05 ASSESSMENT — PULMONARY FUNCTION TESTS
PIF_VALUE: 0
PIF_VALUE: 0
PIF_VALUE: 17
PIF_VALUE: 0
PIF_VALUE: 30
PIF_VALUE: 0
PIF_VALUE: 4
PIF_VALUE: 0
PIF_VALUE: 17

## 2021-05-05 NOTE — PROGRESS NOTES
Spoke with patients mother tammieLex Alamo verbalized understanding of discharge instructions. Patient tolerating oral fluids.

## 2021-05-05 NOTE — ANESTHESIA PRE PROCEDURE
Department of Anesthesiology  Preprocedure Note       Name:  Christelle Heard   Age:  50 y.o.  :  1965                                          MRN:  [id number]         Date:  2021      Surgeon: Deborah Enriquez):  Alisha Arias MD    Procedure: Procedure(s):  EGD W/ANES. (8:15)    Medications prior to admission:   Prior to Admission medications    Medication Sig Start Date End Date Taking?  Authorizing Provider   Dulaglutide (TRULICITY) 1.5 QP/9.0FB SOPN Inject 1.5 mg into the skin once a week   Yes Historical Provider, MD   Cholecalciferol (VITAMIN D3) 125 MCG (5000 UT) TABS Take 1 tablet by mouth daily   Yes Historical Provider, MD   dapagliflozin (FARXIGA) 10 MG tablet Take 10 mg by mouth every morning   Yes Historical Provider, MD   solifenacin (VESICARE) 10 MG tablet Take 10 mg by mouth every evening    Yes Historical Provider, MD   metFORMIN (GLUCOPHAGE-XR) 500 MG extended release tablet Take 2,000 mg by mouth every evening with food 18  Yes    omeprazole (PRILOSEC) 40 MG delayed release capsule Take 40 mg by mouth every evening    Yes Historical Provider, MD   atorvastatin (LIPITOR) 80 MG tablet Take 80 mg by mouth every evening    Yes Historical Provider, MD   sertraline (ZOLOFT) 100 MG tablet Take 100 mg by mouth every evening    Yes Historical Provider, MD       Current medications:    Current Facility-Administered Medications   Medication Dose Route Frequency Provider Last Rate Last Admin    lactated ringers infusion   Intravenous Once Alisha Arias MD           Allergies:  No Known Allergies    Problem List:    Patient Active Problem List   Diagnosis Code    Type 2 diabetes mellitus without complication, without long-term current use of insulin ([facility] [address] E11.9       Past Medical History:        Diagnosis Date    Depression     GERD (gastroesophageal reflux disease)     Hyperlipidemia     Incontinence     Neuropathy     FEET    Type 2 diabetes mellitus without complication ([address]     Wears partial dentures     UPPER AND LOWER       Past Surgical History:        Procedure Laterality Date    CHOLECYSTECTOMY      COLONOSCOPY      ENDOSCOPY, COLON, DIAGNOSTIC      HYSTERECTOMY      OK LAP,SLING OPERATION N/A 3/18/2019    CYSTOSCOPY, WITH TRANSOBTURATOR TAPE (TOT) performed by Fred Ricci MD at [address] N/A 11/26/2018    INSERTION AND REMOVAL OF TRANSOBTURATOR TAPING (TOT) WITH CYSTOSCOPY performed by Fred Ricci MD at [address] EXTRACTION         Social History:    Social History     Tobacco Use    Smoking status: Never Smoker    Smokeless tobacco: Never Used   Substance Use Topics    Alcohol use: No                                Counseling given: Not Answered      Vital Signs (Current):   Vitals:    05/05/21 0737   BP: 122/70   Pulse: 80   Resp: 16   Temp: 97 °F (36.1 °C)   TempSrc: Temporal   SpO2: 95%   Weight: 230 lb (104.3 kg)   Height: 5' 7\" (1.702 m)                                              BP Readings from Last 3 Encounters:   05/05/21 122/70   03/18/19 (!) 96/57   03/18/19 (!) 140/74       NPO Status: Time of last liquid consumption: 2330                        Time of last solid consumption: 2230                        Date of last liquid consumption: 05/04/21                        Date of last solid food consumption: 05/04/21    BMI:   Wt Readings from Last 3 Encounters:   05/05/21 230 lb (104.3 kg)   03/11/20 229 lb (103.9 kg)   05/02/19 254 lb (115.2 kg)     Body mass index is 36.02 kg/m².     CBC:   Lab Results   Component Value Date    WBC 10.4 02/17/2020    RBC 5.20 02/17/2020    HGB 14.4 02/17/2020    HCT 43.5 02/17/2020    MCV 83.6 02/17/2020    RDW 13.7 02/17/2020     02/17/2020       CMP:   Lab Results   Component Value Date     02/17/2020    K 4.2 02/17/2020     02/17/2020    CO2 25 02/17/2020    BUN 8 02/17/2020    CREATININE 0.6 02/17/2020 GFRAA >60 02/17/2020    AGRATIO 1.8 02/17/2020    LABGLOM >60 02/17/2020    GLUCOSE 125 03/11/2020    PROT 7.1 02/17/2020    CALCIUM 9.9 02/17/2020    BILITOT 0.6 02/17/2020    ALKPHOS 168 02/17/2020    AST 39 02/17/2020    ALT 38 02/17/2020       POC Tests: No results for input(s): POCGLU, POCNA, POCK, POCCL, POCBUN, POCHEMO, POCHCT in the last 72 hours. Coags: No results found for: PROTIME, INR, APTT    HCG (If Applicable): No results found for: PREGTESTUR, PREGSERUM, HCG, HCGQUANT     ABGs: No results found for: PHART, PO2ART, ZXF7YGL, OGY6WFR, BEART, A6IDLUKM     Type & Screen (If Applicable):  No results found for: LABABO, LABRH    Drug/Infectious Status (If Applicable):  No results found for: HIV, HEPCAB    COVID-19 Screening (If Applicable):   Lab Results   Component Value Date    COVID19 Not Detected 04/30/2021           Anesthesia Evaluation  Patient summary reviewed and Nursing notes reviewed no history of anesthetic complications:   Airway: Mallampati: II     Neck ROM: full   Dental:          Pulmonary:                              Cardiovascular:                      Neuro/Psych:   (+) psychiatric history:            GI/Hepatic/Renal:   (+) GERD:,           Endo/Other:    (+) Diabetes, . Abdominal:           Vascular:                                        Anesthesia Plan      MAC     ASA 2       Induction: intravenous. Anesthetic plan and risks discussed with patient. Plan discussed with CRNA.                   Sharmin Barclay MD   5/5/2021

## 2021-05-05 NOTE — OP NOTE
[address]                 [address] [facility]                                OPERATIVE REPORT    PATIENT NAME: Dinh Nicole                  :        1965  MED REC NO:   [id number]                          ROOM:  ACCOUNT NO:   [de-identified]                           ADMIT DATE: 2021  PROVIDER:     Salinas Mattson MD    DATE OF PROCEDURE:  2021    PREPROCEDURE DIAGNOSES:  1. Dysphagia. 2.  Dyspepsia. POSTPROCEDURE DIAGNOSES:  1. Severe gastroparesis. 2.  Multiple gastric polyps, likely benign fundic gland polyps. 3.  No evidence of esophageal stricture, stenosis. 4.  Successful empiric dilation with 51-Bhutanese savory dilator. PROCEDURE PERFORMED:  EGD with biopsy and savory dilation. SURGEON:  Salinas Mattson MD    MEDICATIONS:  MAC per Anesthesia. INDICATION FOR PROCEDURE:  This is a 65-year-old female with a history  of diabetes, hypertension, hyperlipidemia, depression, complaints of  dysphagia intermittently associated with nausea. She also complains of  abdominal bloating and dyspepsia. EGD is being performed for diagnostic  purposes. DETAILS OF PROCEDURE:  Informed consent obtained after discussing risks,  benefits, and alternatives. Full history and physical was performed. The patient was classified as ASA class III. Medications were given by  Anesthesia. Cardiopulmonary status was continuously monitored  throughout the procedure. The patient was placed in left lateral  decubitus position. Once adequately sedated, a standard upper  gastroscope was inserted in the mouth and advanced under direct  visualization to the second portion of the duodenum. The entire mucosa  of the esophagus, stomach (retroflexed and forward views), duodenum  (bulb, sweep, and second portion) was examined carefully during  withdrawal.  The patient tolerated the procedure well without any  difficulties. FINDINGS:  ESOPHAGUS:  The entire esophagus appeared normal.  There was no evidence  of inflammation, ulcers, strictures, or Haywood's. Empiric dilation was  performed using 51-Thai savory dilator. There may be some evidence of  dysmotility. STOMACH:  There was a large amount of food bolus retained in the stomach  consistent with gastroparesis. Few small fundic gland polyps were  identified. Biopsies were taken to confirm benign nature of the polyps. Retroflexed view of the stomach appeared normal.  DUODENUM:  Examined  portion of the duodenum appeared normal.    SUMMARY:  1. Severe gastroparesis. 2.  Fundic gland polyps. 3.  Possible mild esophageal dysmotility. 4.  Successful EGD with dilation with 51-Thai savory dilator. RECOMMENDATIONS:  1. Discharge the patient to home when standard parameters are met. 2.  Continue PPI daily. 3.  Schedule barium esophagram to assess for dysmotility. 4.  The patient will also need a gastric emptying scan to assess for  gastroparesis. 5.  She may benefit from a trial of low-dose Reglan.     EBL: <5mL    Matthias Gabriel MD    D: 05/05/2021 9:26:13       T: 05/05/2021 9:32:42     [id number]  Job#: [id number]     Doc#: [id number]    CC:  MD Pamela Boone MD

## 2021-05-05 NOTE — H&P
History and Physical / Pre-Sedation Assessment    Patient:  Marycruz William   :   1965     Intended Procedure:  EGD    HPI: 50year old female with DM, HTN, HLD, depression presents with complaints of dysphagia    Past Medical History:   Diagnosis Date    Depression     GERD (gastroesophageal reflux disease)     Hyperlipidemia     Incontinence     Neuropathy     FEET    Type 2 diabetes mellitus without complication ([facility] [address]     Wears partial dentures     UPPER AND LOWER     Past Surgical History:   Procedure Laterality Date    CHOLECYSTECTOMY      COLONOSCOPY      ENDOSCOPY, COLON, DIAGNOSTIC      HYSTERECTOMY      DC LAP,SLING OPERATION N/A 3/18/2019    CYSTOSCOPY, WITH TRANSOBTURATOR TAPE (TOT) performed by Maria Eugenia Dawkins MD at [address] N/A 2018    INSERTION AND REMOVAL OF TRANSOBTURATOR TAPING (TOT) WITH CYSTOSCOPY performed by Maria Eugenia Dawkins MD at [address]         Medications reviewed  Prior to Admission medications    Medication Sig Start Date End Date Taking?  Authorizing Provider   Dulaglutide (TRULICITY) 1.5 LD/0.8NZ SOPN Inject 1.5 mg into the skin once a week   Yes Historical Provider, MD   Cholecalciferol (VITAMIN D3) 125 MCG (5000 UT) TABS Take 1 tablet by mouth daily   Yes Historical Provider, MD   dapagliflozin (FARXIGA) 10 MG tablet Take 10 mg by mouth every morning   Yes Historical Provider, MD   solifenacin (VESICARE) 10 MG tablet Take 10 mg by mouth every evening    Yes Historical Provider, MD   metFORMIN (GLUCOPHAGE-XR) 500 MG extended release tablet Take 2,000 mg by mouth every evening with food 18  Yes    omeprazole (PRILOSEC) 40 MG delayed release capsule Take 40 mg by mouth every evening    Yes Historical Provider, MD   atorvastatin (LIPITOR) 80 MG tablet Take 80 mg by mouth every evening    Yes Historical Provider, MD   sertraline (ZOLOFT) 100 MG tablet Take 100 mg by mouth every evening    Yes Historical Provider, MD        Allergies: No Known Allergies    Nurses notes reviewed and agreed. Physical Exam:  Vital Signs: /70   Pulse 80   Temp 97 °F (36.1 °C) (Temporal)   Resp 16   Ht 5' 7\" (1.702 m)   Wt 230 lb (104.3 kg)   SpO2 95%   BMI 36.02 kg/m²    Airway: Mallampati: II (soft palate, uvula, fauces visible)  Pulmonary:Normal  Cardiac:Normal  Abdomen:Normal    Pre-Procedure Assessment / Plan:  ASA: Class 3 - A patient with severe systemic disease that limits activity but is not incapacitating  Level of Sedation Plan: Moderate sedation  Post Procedure plan: Return to same level of care    I assessed the patient and find that the patient is in satisfactory condition to proceed with the planned procedure and sedation plan. I have explained the risk, benefits, and alternatives to the procedure; the patient understands and agrees to proceed.        Nereyda Grady  5/5/2021

## 2021-05-07 NOTE — ANESTHESIA POSTPROCEDURE EVALUATION
No intake/output data recorded. Nausea & Vomiting:  No    Level of Consciousness:  Awake    Pain Assessment:  Adequate analgesia    Anesthesia Complications:  No apparent anesthetic complications    SUMMARY      Vital signs stable  OK to discharge from Stage I post anesthesia care.   Care transferred from Anesthesiology department on discharge from perioperative area

## 2021-06-09 ENCOUNTER — HOSPITAL ENCOUNTER (OUTPATIENT)
Dept: ULTRASOUND IMAGING | Age: 56
Discharge: HOME | End: 2021-06-09
Payer: COMMERCIAL

## 2021-06-09 ENCOUNTER — TELEPHONE (OUTPATIENT)
Dept: PHARMACY | Facility: CLINIC | Age: 56
End: 2021-06-09

## 2021-06-09 ENCOUNTER — HOSPITAL ENCOUNTER (OUTPATIENT)
Dept: NUCLEAR MEDICINE | Age: 56
Discharge: HOME | End: 2021-06-09
Payer: COMMERCIAL

## 2021-06-09 DIAGNOSIS — K31.84 GASTROPARESIS: ICD-10-CM

## 2021-06-09 DIAGNOSIS — R79.89 ELEVATED LFTS: ICD-10-CM

## 2021-06-09 PROCEDURE — 76705 ECHO EXAM OF ABDOMEN: CPT

## 2021-06-09 PROCEDURE — 3430000000 HC RX DIAGNOSTIC RADIOPHARMACEUTICAL: Performed by: INTERNAL MEDICINE

## 2021-06-09 PROCEDURE — 78264 GASTRIC EMPTYING IMG STUDY: CPT

## 2021-06-09 PROCEDURE — A9541 TC99M SULFUR COLLOID: HCPCS | Performed by: INTERNAL MEDICINE

## 2021-06-09 RX ADMIN — Medication 1.1 MILLICURIE: at 10:27

## 2021-06-09 NOTE — TELEPHONE ENCOUNTER
Pharmacy Pop Care Documentation:   Called patient with reminder for requirements for Diabetes Management Program.     According to our records, patient is missing the following requirement(s) that must be completed by July 1st 2021:   · 1st 2021 Provider Visit for DM  · 1st 2021 A1C   ·    Spoke to patient at home number and advised them of the above information. Patient verified understanding. Patient requested info sent via [address] [employer] message sent. Angelia Ramírez CP.   [address]   Department, toll free: [phone], option 7

## 2021-06-09 NOTE — LETTER
[address] [facility]  [address], [address]        [address]  [address]           06/21/21     Dear Carmen Mcdonald,    Thanks, so much for taking the first step towards better health. This message is a friendly reminder of the requirements that are due for the [address] [place] [address] Be Well With Diabetes Program by July 1st, 2021 to avoid possible discharge from the program:    · 1st 2021 Provider Visit for DM (1st yearly visit)  · 1st 2021 A1C     You will have to submit documentation of completion of requirements if your Physician does not use the [address] [place] [address] electronic charting system or if you have your lab test done outside of [address] [place] [address]. Return the documentation to [email or web address] or by fax at [phone]    If requirements(s) are not met by July 1st, 2021 you will be disqualified from the program and the credit valued at $600 towards your diabetic medications and supplies will be revoked. You will be able to reapply the following calendar year. Please discard this message if the above requirements have been completed and documentation has been submitted. [address]  Phone: toll free [phone], option 7  Email: [email or web address]  Fax Number: [phone]

## 2021-07-12 ENCOUNTER — CLINICAL DOCUMENTATION (OUTPATIENT)
Dept: PHARMACY | Facility: CLINIC | Age: 56
End: 2021-07-12

## 2021-07-12 ENCOUNTER — TELEPHONE (OUTPATIENT)
Dept: PHARMACY | Facility: CLINIC | Age: 56
End: 2021-07-12

## 2021-07-12 NOTE — PROGRESS NOTES
Pharmacy Pop Care Documentation:     AVS received for required office visit on: 4/12/21: Micah Diaz per scanned document

## 2021-07-12 NOTE — TELEPHONE ENCOUNTER
Pharmacy Pop Care Documentation:   Called patient with reminder for requirements for Diabetes Management Program.     According to our records, patient is missing the following requirement(s) that must be completed by July 1st 2021:   · 1st 2021 Provider Visit for DM  · 1st 2021 A1C        Spoke to patient at mobile number and advised them of the above information. Patient verified understanding. Took e-mail address to send documentation.            Mindy Flores, [address]   Department, toll free: [phone], option 7

## 2021-07-20 ENCOUNTER — HOSPITAL ENCOUNTER (OUTPATIENT)
Age: 56
Discharge: HOME | End: 2021-07-20
Payer: COMMERCIAL

## 2021-07-20 LAB
INR BLD: 1.02 (ref 0.88–1.12)
PROTHROMBIN TIME: 11.5 SEC (ref 9.9–12.7)

## 2021-07-20 PROCEDURE — 86038 ANTINUCLEAR ANTIBODIES: CPT

## 2021-07-20 PROCEDURE — 80074 ACUTE HEPATITIS PANEL: CPT

## 2021-07-20 PROCEDURE — 85610 PROTHROMBIN TIME: CPT

## 2021-07-20 PROCEDURE — 82104 ALPHA-1-ANTITRYPSIN PHENO: CPT

## 2021-07-20 PROCEDURE — 83516 IMMUNOASSAY NONANTIBODY: CPT

## 2021-07-20 PROCEDURE — 36415 COLL VENOUS BLD VENIPUNCTURE: CPT

## 2021-07-20 PROCEDURE — 82105 ALPHA-FETOPROTEIN SERUM: CPT

## 2021-07-20 PROCEDURE — 82728 ASSAY OF FERRITIN: CPT

## 2021-07-20 PROCEDURE — 83690 ASSAY OF LIPASE: CPT

## 2021-07-20 PROCEDURE — 82103 ALPHA-1-ANTITRYPSIN TOTAL: CPT

## 2021-07-20 PROCEDURE — 83540 ASSAY OF IRON: CPT

## 2021-07-20 PROCEDURE — 83550 IRON BINDING TEST: CPT

## 2021-07-21 LAB
ANTI-NUCLEAR ANTIBODY (ANA): NEGATIVE
FERRITIN: 20.1 NG/ML (ref 15–150)
HAV IGM SER IA-ACNC: NORMAL
HEPATITIS B CORE IGM ANTIBODY: NORMAL
HEPATITIS B SURFACE ANTIGEN INTERPRETATION: NORMAL
HEPATITIS C ANTIBODY INTERPRETATION: NORMAL
IRON SATURATION: 13 % (ref 15–50)
IRON: 51 UG/DL (ref 37–145)
LIPASE: 28 U/L (ref 13–60)
TOTAL IRON BINDING CAPACITY: 394 UG/DL (ref 260–445)

## 2021-07-22 LAB — F-ACTIN AB IGG: 15 UNITS (ref 0–19)

## 2021-07-23 ENCOUNTER — TELEPHONE (OUTPATIENT)
Dept: PHARMACY | Facility: CLINIC | Age: 56
End: 2021-07-23

## 2021-07-23 LAB
AFP: 3 UG/L
ALPHA-1 ANTITRYPSIN PHENOTYPE: NORMAL
ALPHA-1 ANTITRYPSIN: 125 MG/DL (ref 90–200)

## 2021-07-23 NOTE — TELEPHONE ENCOUNTER
[facility] CLINICAL PHARMACY REVIEW - BE WELL WITH DIABETES: Statin, ACE/ARB Gaps  =================================================================  Faustino Zapata is a 49 y.o. female enrolled in the [address] [place] [address] Diabetes Program.      Identified care gap: Patient with diabetes not currently filling ACE/ARB therapy    Pertinent medications  Current Outpatient Medications   Medication Sig Dispense Refill    Dulaglutide (TRULICITY) 1.5 KAVIN/5.8VN SOPN Inject 1.5 mg into the skin once a week      Cholecalciferol (VITAMIN D3) 125 MCG (5000 UT) TABS Take 1 tablet by mouth daily      dapagliflozin (FARXIGA) 10 MG tablet Take 10 mg by mouth every morning      solifenacin (VESICARE) 10 MG tablet Take 10 mg by mouth every evening       metFORMIN (GLUCOPHAGE-XR) 500 MG extended release tablet Take 2,000 mg by mouth every evening with food 1 tablet 0    omeprazole (PRILOSEC) 40 MG delayed release capsule Take 40 mg by mouth every evening       atorvastatin (LIPITOR) 80 MG tablet Take 80 mg by mouth every evening       sertraline (ZOLOFT) 100 MG tablet Take 100 mg by mouth every evening        No current facility-administered medications for this visit.         Pertinent Labs/Vitals:  STATIN:  Lab Results   Component Value Date    LDLCALC 67 04/07/2021    LDLCALC 60 03/11/2020    LDLCALC 67 08/12/2019     Lab Results   Component Value Date    ALT 38 02/17/2020        The 10-year ASCVD risk score (Raudel Alvarez, et al., 2013) is: 1.7%    Values used to calculate the score:      Age: 49 years      Sex: Female      Is Non- : No      Diabetic: Yes      Tobacco smoker: No      Systolic Blood Pressure: 686 mmHg      Is BP treated: No      HDL Cholesterol: 55 mg/dL      Total Cholesterol: 144 mg/dL    ACE/ARB:  BP Readings from Last 3 Encounters:   05/05/21 (!) 0/0   05/05/21 106/67   03/18/19 (!) 96/57      Lab Results   Component Value Date    CREATININE 0.6 02/17/2020      CrCl cannot be calculated (Patient's most recent lab result is older than the maximum 120 days allowed. ). Lab Results   Component Value Date    LABMICR <1.20 01/24/2019       Assessment/Outcomes:    No - On ACEi/ARB or contraindication(s) Not identified     BP on last OV in media tab 130/80. Last urine microalbumin 2019    Results for Christine Gomez (MRN [id number]) as of 7/23/2021 15:26   Ref. Range 1/24/2019 09:30   Creatinine, Ur Latest Ref Range: 28.0 - 259.0 mg/dL 284.8 (H)   Microalbumin Creatinine Ratio Latest Ref Range: 0.0 - 30.0 mg/g see below   Microalbumin, Random Urine Latest Ref Range: <2.0 mg/dL <1.20     Due for urin micro and /80. Will fax doctor.      Wander Montanez, PharmD, [address] [place] [address] Clinical Pharmacist  Direct: [phone]  Department: [phone], option 7  =============================  For Pharmacy Admin Tracking Only     CPA in place:  No   Recommendation Provided To: Provider: 1 via Fax sent to office   Intervention Detail: New Rx: 1, reason: Needs Additional Therapy   Gap Closed?: No    Intervention Accepted By: Provider: 0   Time Spent (min): 10

## 2021-07-23 NOTE — LETTER
Dr. Mari Josue MD  Fax: [phone]    Chiki Crum  1965    Your patient is currently enrolled in the Be Well with Diabetes program. The goal of this voluntary program is to help [address] employees and covered dependents reach their health maintenance goals in regards to their diabetes diagnosis. After your patient's recent visit with a [address] Clinical Pharmacist, the below were identified as opportunities to assist with their diabetes management:    - Patient due for urine microalbumin, /80  - Would patient benefit from the addition of low dose lisinopril? To remain active in the program, we require the patient to meet the following requirements and documentation must be provided by the below deadlines. Please address the following incompletions with your patient to assist patient's program involvement or return a message to the fax number below with reason/contraindication as to why patient cannot complete requirement. Ongoing Program Requirements (to be completed by 12/31/2021):  [] Office visit with provider for DM (2nd): Completed on __________  [] A1c (2nd): Completed on __________, Result: __________   [] Lipid panel: Completed on __________, Result: __________  [] Urine microalbumin: Completed on __________, Result: __________  [] Pneumococcal vaccination (if applicable): Completed on __________  [] Influenza vaccination for [id number]: Completed on __________  [] On statin (list statin and dose or contraindication): __________   [] On ACEi/ARB (list medication and dose or contraindication): __________    Please contact our team with any questions.      Thank you,  [facility]  Telephone [phone] Option #7   Fax [phone]

## 2021-07-28 LAB — MITOCHONDRIAL M2 AB, IGG: 2.2 U/ML (ref 0–4)

## 2021-08-06 LAB
AVERAGE GLUCOSE: NORMAL
HBA1C MFR BLD: 7.2 %
LDL CHOLESTEROL DIRECT: 61 MG/DL

## 2021-08-07 LAB
CHOLESTEROL, TOTAL: 160 MG/DL (ref 0–199)
GLUCOSE BLD-MCNC: 151 MG/DL (ref 70–99)
HDLC SERPL-MCNC: 63 MG/DL (ref 40–60)
LDL CHOLESTEROL CALCULATED: 73 MG/DL
TRIGL SERPL-MCNC: 118 MG/DL (ref 0–150)

## 2021-08-08 LAB
ESTIMATED AVERAGE GLUCOSE: 157.1 MG/DL
HBA1C MFR BLD: 7.1 %

## 2021-08-12 LAB — MICROALBUMIN UR-MCNC: NEGATIVE

## 2021-08-20 ENCOUNTER — CLINICAL DOCUMENTATION (OUTPATIENT)
Dept: PHARMACY | Facility: CLINIC | Age: 56
End: 2021-08-20

## 2021-08-20 NOTE — PROGRESS NOTES
Pharmacy Pop Care Documentation:     AVS received for required office visit on: 21: Dr. Diya Lynch      Provider also sent documentation of the followin21: A1C: 7.2%  21: LDL: 61  21: Random Urine Albumin was negative    Patient on the following medication:  Atorvastatin 80 mg  Lisinopril 10 mg

## 2021-08-25 ENCOUNTER — HOSPITAL ENCOUNTER (OUTPATIENT)
Dept: MRI IMAGING | Age: 56
Discharge: HOME | End: 2021-08-25
Payer: COMMERCIAL

## 2021-08-25 DIAGNOSIS — R16.0 LIVER MASS: ICD-10-CM

## 2021-08-25 PROCEDURE — A9579 GAD-BASE MR CONTRAST NOS,1ML: HCPCS | Performed by: FAMILY MEDICINE

## 2021-08-25 PROCEDURE — 6360000004 HC RX CONTRAST MEDICATION: Performed by: FAMILY MEDICINE

## 2021-08-25 PROCEDURE — 74183 MRI ABD W/O CNTR FLWD CNTR: CPT

## 2021-08-25 RX ADMIN — GADOTERIDOL 20 ML: 279.3 INJECTION, SOLUTION INTRAVENOUS at 08:38

## 2021-10-01 ENCOUNTER — HOSPITAL ENCOUNTER (OUTPATIENT)
Dept: MAMMOGRAPHY | Age: 56
Discharge: HOME | End: 2021-10-06
Payer: COMMERCIAL

## 2021-10-01 VITALS — WEIGHT: 230 LBS | BODY MASS INDEX: 36.1 KG/M2 | HEIGHT: 67 IN

## 2021-10-01 DIAGNOSIS — Z12.31 VISIT FOR SCREENING MAMMOGRAM: ICD-10-CM

## 2021-10-01 PROCEDURE — 77063 BREAST TOMOSYNTHESIS BI: CPT

## 2021-10-07 ENCOUNTER — TELEPHONE (OUTPATIENT)
Dept: PHARMACY | Facility: CLINIC | Age: 56
End: 2021-10-07

## 2021-10-07 NOTE — TELEPHONE ENCOUNTER
As of 10/4/21 patient has used $520 of the maximum of $600 a year in waived co pays for specific medications and pharmacy-related supplies through the [address] for the DM Program.    Patient currently enrolled in the DM Program and is nearing the maximum of $600 a year in waived co pays for specific medications and pharmacy-related supplies through the [address]. Courtesy call placed to patient at mobile number to advise them of the above information. Spoke to patient and advised her of the above information. Patient verified understanding.     Sydney Mcrae, [address] [place] [address]   Phone: [phone], option #3

## 2022-01-05 ENCOUNTER — TELEPHONE (OUTPATIENT)
Dept: PHARMACY | Facility: CLINIC | Age: 57
End: 2022-01-05

## 2022-01-05 NOTE — TELEPHONE ENCOUNTER
2022 Annual Pharmacist Visit    Called patient to schedule 2022 yearly pharmacist appointment to discuss medications for Diabetes Management Program.    Spoke to patient and appointment scheduled for 1/10/22 at 1:00pm.       Please call back at [phone], option #3         Soren Pierce, [address]   Phone: [phone], option #3

## 2022-01-05 NOTE — TELEPHONE ENCOUNTER
For [place] in place:  No   Recommendation Provided To: Patient/Caregiver: 1 via Telephone   Intervention Detail: Scheduled Appointment   Gap Closed?: Yes    Intervention Accepted By: Patient/Caregiver: 1   Time Spent (min): 10

## 2022-01-10 ENCOUNTER — SCHEDULED TELEPHONE ENCOUNTER (OUTPATIENT)
Dept: PHARMACY | Facility: CLINIC | Age: 57
End: 2022-01-10

## 2022-01-10 RX ORDER — LISINOPRIL 10 MG/1
10 TABLET ORAL DAILY
COMMUNITY

## 2022-01-10 RX ORDER — PANTOPRAZOLE SODIUM 40 MG/1
40 TABLET, DELAYED RELEASE ORAL DAILY
COMMUNITY

## 2022-01-10 NOTE — TELEPHONE ENCOUNTER
[facility] HEALTH CLINICAL PHARMACY REVIEW - Be Well with Diabetes    Mehreen Salguero is a 49 y.o. female enrolled in the [address] [place], [place] [address] Employee Diabetes Program. Patient provided Mary Ann Redding with verbal consent to remain in the program for this year. Medications:  Medication Sig Notes    Dulaglutide (TRULICITY) 1.5 AE/4.0BM SOPN Inject 1.5 mg into the skin once a week     Cholecalciferol (VITAMIN D3) 125 MCG (5000 UT) TABS Take 1 tablet by mouth daily     dapagliflozin (FARXIGA) 10 MG tablet Take 10 mg by mouth every morning     solifenacin (VESICARE) 10 MG tablet Take 10 mg by mouth every evening      metFORMIN (GLUCOPHAGE-XR) 500 MG extended release tablet Take 2,000 mg by mouth every evening with food     omeprazole (PRILOSEC) 40 MG delayed release capsule Take 40 mg by mouth every evening  Removed from medication list. Now takes pantoprazole 40 mg daily    atorvastatin (LIPITOR) 80 MG tablet Take 80 mg by mouth every evening      sertraline (ZOLOFT) 100 MG tablet Take 100 mg by mouth every evening       Additional Medications:  - lisinopril 10 mg tablets- once daily.  States started in August 2021  - pantoprazole 40 mg tablets- once daily    Current Pharmacy[facility]  Current testing supplies/frequency: Prodigy; checks once every 2-3 days  Pen needles/syringes: n/a    Allergies:  No Known Allergies   Vitals/Labs:  BP Readings from Last 3 Encounters:   05/05/21 (!) 0/0   05/05/21 106/67   03/18/19 (!) 96/57     Lab Results   Component Value Date    LABMICR Negative 08/12/2021     Lab Results   Component Value Date    LABA1C 7.1 08/07/2021    LABA1C 7.2 08/06/2021    LABA1C 7.1 04/07/2021     Lab Results   Component Value Date    CHOL 160 08/07/2021    TRIG 118 08/07/2021    HDL 63 (H) 08/07/2021    LDLCALC 73 08/07/2021    LDLDIRECT 61 08/06/2012     ALT   Date Value Ref Range Status   02/17/2020 38 10 - 40 U/L Final     AST   Date Value Ref Range Status 02/17/2020 39 (H) 15 - 37 U/L Final     The 10-year ASCVD risk score (Meenakshi Barney, et al., 2013) is: 1.6%    Values used to calculate the score:      Age: 49 years      Sex: Female      Is Non- : No      Diabetic: Yes      Tobacco smoker: No      Systolic Blood Pressure: 199 mmHg      Is BP treated: No      HDL Cholesterol: 63 mg/dL      Total Cholesterol: 160 mg/dL     Lab Results   Component Value Date    CREATININE 0.6 02/17/2020     CrCl cannot be calculated (Patient's most recent lab result is older than the maximum 120 days allowed. ). Lab Results   Component Value Date    LABGLOM >60 02/17/2020    LABGLOM >60 11/15/2019    LABGLOM >60 08/12/2019    LABGLOM >60 01/24/2019       Immunizations:  Immunization History   Administered Date(s) Administered    COVID-19, Francine Chamberlain, Primary or Immunocompromised, PF, 100mcg/0.5mL 01/19/2021, 03/08/2021    Influenza Virus Vaccine 09/24/2013, 09/25/2015, 09/29/2016, 10/29/2018, 10/30/2019    Pneumococcal Polysaccharide ([id number]) 07/09/2013    Tdap (Boostrix, Adacel) 04/03/2009      Social History:  Social History     Tobacco Use    Smoking status: Never Smoker    Smokeless tobacco: Never Used   Substance Use Topics    Alcohol use: No     ASSESSMENT:  Initial Program Requirements (Y indicates has completed for the year, N indicates needs to be completed by 07/01/2022): No - Provider Visit for DM (1st)  No - A1c (1st)     Ongoing Program Requirements (Y indicates has completed for the year, N indicates needs to be completed by 12/31/2022):   No - Provider Visit for DM (2nd)  n/a - ACC/diabetes educator visit (if A1c over 8%)  No - A1c (2nd)  No - Lipid panel  No - Urine microalbumin  Yes - Pneumococcal vaccination: Up-to-date, not needed again until age 74  No - Influenza vaccination for Fall 2022  n/a - Medication adherence over 70%  Yes - On statin or contraindication(s) atorvastatin 80 mg tablets  Yes - On ACEi/ARB or contraindication(s) lisinopril 10 mg tablets     Formulary Medication Review:  Non-formulary or medications with cost-effective alternatives: n/a. Current medications eligible for copay waiver, up to $600, through [id number]:  - Trulicity, Jhonnie Carlos, metformin XR, atorvastatin, lisinopril  - Prodigy     Diabetes Care:   - Glycemic Goal: <7.0%. Type 2 DM. Last A1c on file 7.1% 8/7/21.  - Home blood sugar records: Fast[place] 130-140s range, sometimes 120s  - Any episodes of hypoglycemia? No  Treatment of hypoglycemia (low blood sugars): if sugar is below 80 mg/dL, treat with 15 grams of simple sugar [rapid acting carb] (3-4 glucose tablets, 4 oz of regular juice, 1/2 can of pop, 5 sugar-containing candies, 1 Tablespoon of honey). RECHECK in 15 minutes. If above 80 mg/dL, have small meal or snack. If not above 80 mg/dL, repeat the 15 grams of simple carbs until above 80 mg/dL. - Eye exam current (within one year): yes; usually every November or December  - Foot exam current (within one year): yes  - Reduce Pill Simms: Currently takes Jhonnie Carlos and metformin. Discussed this comes as a combination tablet- Xigduo XR 5-1000 mg tablets. Has upcoming appointment with PCP. May discuss if does not think Jhonnie Carlos or metformin doses will be changing.  - Medication compliance: compliant most of the time. Sometimes may forget Trulicity since it is once weekly. Discussed picking day of the week it would be easiest to remember this and setting weekly alarm on phone.  - Diet compliance: States she does crave sweets. She watches portions on food. Does drink pop and tea. Discussed watching sugar content in the drinks as well. - Current exercise: once weekly. Discussed steadily increasing this amount. Other Considerations:  - Blood Pressure Goal: BP less than 140/90 mmHg due to history of DM: Is at blood pressure goal.   - Lipids: Patient is prescribed high-intensity statin therapy.   - Smoking status: Never smoked    PLAN:  - DM program gaps identified:   · Initial requirements: Provider Visit for DM (1st) and A1c (1st)   · Ongoing requirements: Provider visit for DM (2nd), ACC/diabetes educator visit (if A1c over 8%), A1c (2nd), Lipid panel, Urine microalbumin, Influenza vaccination for [id number] and Medication adherence over 70%   - Education to patient: Addressed diet and exercise, Discussed foot care, Reminded to get yearly retinal exam, Discussed ways to avoid symptomatic hypoglycemia, Addressed medication adherence and Reminder A1c and lipid panel can be completed for free at  Well screenings   - Follow up: PCP for identified gaps or as scheduled below  - Upcoming appointments:   No future appointments.     Wilberto Moreno, Gordon[place], [facility] [place]   Department, toll free: [phone], option 3    For [place] Moreno in place:  No   Gap Closed?: Yes    Time Spent (min): 45

## 2022-02-08 ENCOUNTER — TELEPHONE (OUTPATIENT)
Dept: PHARMACY | Facility: CLINIC | Age: 57
End: 2022-02-08

## 2022-02-08 NOTE — TELEPHONE ENCOUNTER
[facility] CLINICAL PHARMACY REVIEW - Be Well with Diabetes    Triston Jones is a 49 y.o. female enrolled in the [address] [place] [address],[address] Employee Diabetes Program. Patient reached out via email asking for new scripts for test strips. I was able to do a fax request in [employer] for prodigy test strips. Old script states test once daily. No active or  scripts for lancets. Will fax pcp for lancets.      Bob Villa, PharmD, [address]  Pharmacist  Department: [address] [place] [address] Ext in place:  No   Recommendation Provided To: Provider: 2 via Fax sent to office   Intervention Detail: Refill(s) Provided   Gap Closed?: Yes    Intervention Accepted By: Provider: 0   Time Spent (min): 10

## 2022-02-08 NOTE — LETTER
PRESCRIPTION REQUEST  BE WELL WITH DIABETES PROGRAM  Prescriber:  Issa Junior MD  [address] [facility] / [place] [address]  Phone: [phone] Fax: [phone]     Patient:  Carlton Mathis 1965  [address] [facility]  [phone] (home)      Rationale:   Patient's prescriptions for testing supplies . Script for lancets for prodigy meter       Rx: Prodigy Lancets  #: 100  Refills: 3    Directions: Use 1 time(s) daily or as directed to test blood glucose         Prescriber Response:    Prescription(s) approved (please [place] one):  YES  NO    Other response: ________________________________________      ______________________________________   __________________  Authorized By       Date     Pharmacy: [facility]                        Phone:  [phone]    Corinna Delgado, [facility]  Fax:  [phone]    [address]     The information transmitted is intended only for the person or entity to which it is addressed and may contain confidential and/or privileged material. Any review, retransmission, dissemination or other use of, or taking of any action in reliance upon, this information by persons or entities other than the intended recipient is prohibited. This document contains information covered under the Privacy Act, 5 (a), and/or the [employer] and Accountability Act ([address]) and its various implementing regulations and must be protected in accordance with those provisions. If you received this in error, please contact the sender and delete the material from any computer.

## 2022-03-08 ENCOUNTER — HOSPITAL ENCOUNTER (OUTPATIENT)
Age: 57
Discharge: HOME | End: 2022-03-08
Payer: COMMERCIAL

## 2022-03-08 LAB
A/G RATIO: 1.7 (ref 1.1–2.2)
ALBUMIN SERPL-MCNC: 4.4 G/DL (ref 3.4–5)
ALP BLD-CCNC: 145 U/L (ref 40–129)
ALT SERPL-CCNC: 29 U/L (ref 10–40)
ANION GAP SERPL CALCULATED.3IONS-SCNC: 14 MMOL/L (ref 3–16)
AST SERPL-CCNC: 34 U/L (ref 15–37)
BASOPHILS ABSOLUTE: 0.1 K/UL (ref 0–0.2)
BASOPHILS RELATIVE PERCENT: 0.9 %
BILIRUB SERPL-MCNC: 0.6 MG/DL (ref 0–1)
BUN BLDV-MCNC: 9 MG/DL (ref 7–20)
CALCIUM SERPL-MCNC: 9.5 MG/DL (ref 8.3–10.6)
CHLORIDE BLD-SCNC: 102 MMOL/L (ref 99–110)
CHOLESTEROL, TOTAL: 162 MG/DL (ref 0–199)
CO2: 25 MMOL/L (ref 21–32)
CREAT SERPL-MCNC: 0.8 MG/DL (ref 0.6–1.1)
EOSINOPHILS ABSOLUTE: 0.3 K/UL (ref 0–0.6)
EOSINOPHILS RELATIVE PERCENT: 3.9 %
GFR AFRICAN AMERICAN: >60
GFR NON-AFRICAN AMERICAN: >60
GLUCOSE BLD-MCNC: 142 MG/DL (ref 70–99)
HCT VFR BLD CALC: 40.8 % (ref 36–48)
HDLC SERPL-MCNC: 57 MG/DL (ref 40–60)
HEMOGLOBIN: 13.6 G/DL (ref 12–16)
LDL CHOLESTEROL CALCULATED: 80 MG/DL
LYMPHOCYTES ABSOLUTE: 1.7 K/UL (ref 1–5.1)
LYMPHOCYTES RELATIVE PERCENT: 20.5 %
MCH RBC QN AUTO: 26.1 PG (ref 26–34)
MCHC RBC AUTO-ENTMCNC: 33.3 G/DL (ref 31–36)
MCV RBC AUTO: 78.5 FL (ref 80–100)
MONOCYTES ABSOLUTE: 0.5 K/UL (ref 0–1.3)
MONOCYTES RELATIVE PERCENT: 6.6 %
NEUTROPHILS ABSOLUTE: 5.7 K/UL (ref 1.7–7.7)
NEUTROPHILS RELATIVE PERCENT: 68.1 %
PDW BLD-RTO: 14.6 % (ref 12.4–15.4)
PLATELET # BLD: 194 K/UL (ref 135–450)
PMV BLD AUTO: 10 FL (ref 5–10.5)
POTASSIUM SERPL-SCNC: 4 MMOL/L (ref 3.5–5.1)
RBC # BLD: 5.2 M/UL (ref 4–5.2)
SODIUM BLD-SCNC: 141 MMOL/L (ref 136–145)
TOTAL PROTEIN: 7 G/DL (ref 6.4–8.2)
TRIGL SERPL-MCNC: 125 MG/DL (ref 0–150)
TSH SERPL DL<=0.05 MIU/L-ACNC: 2.89 UIU/ML (ref 0.27–4.2)
VLDLC SERPL CALC-MCNC: 25 MG/DL
WBC # BLD: 8.4 K/UL (ref 4–11)

## 2022-03-08 PROCEDURE — 80053 COMPREHEN METABOLIC PANEL: CPT

## 2022-03-08 PROCEDURE — 85025 COMPLETE CBC W/AUTO DIFF WBC: CPT

## 2022-03-08 PROCEDURE — 36415 COLL VENOUS BLD VENIPUNCTURE: CPT

## 2022-03-08 PROCEDURE — 83036 HEMOGLOBIN GLYCOSYLATED A1C: CPT

## 2022-03-08 PROCEDURE — 84443 ASSAY THYROID STIM HORMONE: CPT

## 2022-03-08 PROCEDURE — 80061 LIPID PANEL: CPT

## 2022-03-09 LAB
ESTIMATED AVERAGE GLUCOSE: 159.9 MG/DL
HBA1C MFR BLD: 7.2 %

## 2022-04-12 LAB
CHOLESTEROL, TOTAL: 158 MG/DL (ref 0–199)
GLUCOSE BLD-MCNC: 131 MG/DL (ref 70–99)
HDLC SERPL-MCNC: 46 MG/DL (ref 40–60)
LDL CHOLESTEROL CALCULATED: 84 MG/DL
TRIGL SERPL-MCNC: 140 MG/DL (ref 0–150)

## 2022-06-08 ENCOUNTER — TELEPHONE (OUTPATIENT)
Dept: PHARMACY | Facility: CLINIC | Age: 57
End: 2022-06-08

## 2022-06-14 ENCOUNTER — CLINICAL DOCUMENTATION (OUTPATIENT)
Dept: PHARMACY | Facility: CLINIC | Age: 57
End: 2022-06-14

## 2022-06-14 NOTE — PROGRESS NOTES
For Pharmacy Admin Tracking Only     CPA in place:  No   Gap Closed?: Yes    Time Spent (min): 10

## 2022-06-14 NOTE — PROGRESS NOTES
Pharmacy Pop Care Documentation:     AVS received for required office visit on: 6/10/22: Dr. Sandra Sampson

## 2022-10-04 ENCOUNTER — HOSPITAL ENCOUNTER (OUTPATIENT)
Dept: MAMMOGRAPHY | Age: 57
Discharge: HOME | End: 2022-10-04
Payer: COMMERCIAL

## 2022-10-04 VITALS — BODY MASS INDEX: 36.1 KG/M2 | WEIGHT: 230 LBS | HEIGHT: 67 IN

## 2022-10-04 DIAGNOSIS — Z12.31 VISIT FOR SCREENING MAMMOGRAM: ICD-10-CM

## 2022-10-04 PROCEDURE — 77063 BREAST TOMOSYNTHESIS BI: CPT

## 2022-10-07 ENCOUNTER — HOSPITAL ENCOUNTER (OUTPATIENT)
Age: 57
Discharge: HOME | End: 2022-10-07
Payer: COMMERCIAL

## 2022-10-07 LAB
A/G RATIO: 1.5 (ref 1.1–2.2)
ALBUMIN SERPL-MCNC: 3.7 G/DL (ref 3.4–5)
ALP BLD-CCNC: 121 U/L (ref 40–129)
ALT SERPL-CCNC: 31 U/L (ref 10–40)
ANION GAP SERPL CALCULATED.3IONS-SCNC: 11 MMOL/L (ref 3–16)
AST SERPL-CCNC: 44 U/L (ref 15–37)
BASOPHILS ABSOLUTE: 0 K/UL (ref 0–0.2)
BASOPHILS RELATIVE PERCENT: 0.6 %
BILIRUB SERPL-MCNC: 0.6 MG/DL (ref 0–1)
BUN BLDV-MCNC: 6 MG/DL (ref 7–20)
CALCIUM SERPL-MCNC: 9 MG/DL (ref 8.3–10.6)
CHLORIDE BLD-SCNC: 104 MMOL/L (ref 99–110)
CO2: 26 MMOL/L (ref 21–32)
CREAT SERPL-MCNC: 0.7 MG/DL (ref 0.6–1.1)
EOSINOPHILS ABSOLUTE: 0.2 K/UL (ref 0–0.6)
EOSINOPHILS RELATIVE PERCENT: 3.4 %
GFR AFRICAN AMERICAN: >60
GFR NON-AFRICAN AMERICAN: >60
GLUCOSE BLD-MCNC: 140 MG/DL (ref 70–99)
HCT VFR BLD CALC: 37.9 % (ref 36–48)
HEMOGLOBIN: 12.8 G/DL (ref 12–16)
LYMPHOCYTES ABSOLUTE: 2 K/UL (ref 1–5.1)
LYMPHOCYTES RELATIVE PERCENT: 31.3 %
MCH RBC QN AUTO: 26.4 PG (ref 26–34)
MCHC RBC AUTO-ENTMCNC: 33.7 G/DL (ref 31–36)
MCV RBC AUTO: 78.4 FL (ref 80–100)
MONOCYTES ABSOLUTE: 0.5 K/UL (ref 0–1.3)
MONOCYTES RELATIVE PERCENT: 6.9 %
NEUTROPHILS ABSOLUTE: 3.8 K/UL (ref 1.7–7.7)
NEUTROPHILS RELATIVE PERCENT: 57.8 %
PDW BLD-RTO: 15.2 % (ref 12.4–15.4)
PLATELET # BLD: 174 K/UL (ref 135–450)
PMV BLD AUTO: 9.8 FL (ref 5–10.5)
POTASSIUM SERPL-SCNC: 3.8 MMOL/L (ref 3.5–5.1)
RBC # BLD: 4.83 M/UL (ref 4–5.2)
SODIUM BLD-SCNC: 141 MMOL/L (ref 136–145)
TOTAL PROTEIN: 6.2 G/DL (ref 6.4–8.2)
WBC # BLD: 6.5 K/UL (ref 4–11)

## 2022-10-07 PROCEDURE — 80053 COMPREHEN METABOLIC PANEL: CPT

## 2022-10-07 PROCEDURE — 83036 HEMOGLOBIN GLYCOSYLATED A1C: CPT

## 2022-10-07 PROCEDURE — 85025 COMPLETE CBC W/AUTO DIFF WBC: CPT

## 2022-10-08 LAB
ESTIMATED AVERAGE GLUCOSE: 174.3 MG/DL
HBA1C MFR BLD: 7.7 %

## 2022-11-04 ENCOUNTER — HOSPITAL ENCOUNTER (OUTPATIENT)
Age: 57
Discharge: HOME | End: 2022-11-04
Payer: COMMERCIAL

## 2022-11-04 ENCOUNTER — HOSPITAL ENCOUNTER (OUTPATIENT)
Dept: ULTRASOUND IMAGING | Age: 57
Discharge: HOME | End: 2022-11-04
Payer: COMMERCIAL

## 2022-11-04 DIAGNOSIS — K76.0 FATTY LIVER: ICD-10-CM

## 2022-11-04 LAB
INR BLD: 1.09 (ref 0.87–1.14)
PROTHROMBIN TIME: 13.9 SEC (ref 11.7–14.5)

## 2022-11-04 PROCEDURE — 82105 ALPHA-FETOPROTEIN SERUM: CPT

## 2022-11-04 PROCEDURE — 36415 COLL VENOUS BLD VENIPUNCTURE: CPT

## 2022-11-04 PROCEDURE — 83690 ASSAY OF LIPASE: CPT

## 2022-11-04 PROCEDURE — 76705 ECHO EXAM OF ABDOMEN: CPT

## 2022-11-04 PROCEDURE — 85610 PROTHROMBIN TIME: CPT

## 2022-11-05 LAB — LIPASE: 23 U/L (ref 13–60)

## 2022-11-09 LAB — AFP: 3.1 UG/L

## 2022-12-22 ENCOUNTER — HOSPITAL ENCOUNTER (OUTPATIENT)
Dept: MRI IMAGING | Age: 57
Discharge: HOME | End: 2022-12-22
Payer: COMMERCIAL

## 2022-12-22 DIAGNOSIS — K74.60 HEPATIC CIRRHOSIS, UNSPECIFIED HEPATIC CIRRHOSIS TYPE, UNSPECIFIED WHETHER ASCITES PRESENT (HCC): ICD-10-CM

## 2022-12-22 DIAGNOSIS — K76.89 LIVER NODULE: ICD-10-CM

## 2022-12-22 PROCEDURE — A9579 GAD-BASE MR CONTRAST NOS,1ML: HCPCS | Performed by: INTERNAL MEDICINE

## 2022-12-22 PROCEDURE — 74183 MRI ABD W/O CNTR FLWD CNTR: CPT

## 2022-12-22 PROCEDURE — 6360000004 HC RX CONTRAST MEDICATION: Performed by: INTERNAL MEDICINE

## 2022-12-22 RX ADMIN — GADOTERIDOL 20 ML: 279.3 INJECTION, SOLUTION INTRAVENOUS at 15:22

## 2022-12-23 ENCOUNTER — TELEPHONE (OUTPATIENT)
Dept: PHARMACY | Facility: CLINIC | Age: 57
End: 2022-12-23

## 2022-12-23 NOTE — TELEPHONE ENCOUNTER
[facility] Diabetes Program    Carson Miguel is a 56 y.o. female enrolled in the [facility] with Diabetes Program. The goal of this voluntary program is to help employees and covered dependents reach their health maintenance goals in regards to their diabetes diagnosis. According to our records, patient is missing the following requirement(s) that must be completed by December 31, 2022 to avoid discharge from the program:      Second diabetes visit with your provider in 2022  Urine protein/microalbumin       Plan:    Attempt made to reach patient by telephone to review above. Spoke to patient - verbalized understanding.        August Vick[address]   Direct: [phone]  Phone: toll free [phone]      For Pharmacy Admin Tracking Only    Program: [address] in place:  No  Gap Closed?: Yes   Time Spent (min): 5

## 2023-01-18 ENCOUNTER — TELEPHONE (OUTPATIENT)
Dept: PHARMACY | Facility: CLINIC | Age: 58
End: 2023-01-18

## 2023-01-18 NOTE — TELEPHONE ENCOUNTER
Pharmacy Pop Care Documentation:   Called patient with reminder for 2022 requirements for Diabetes Management Program.     According to our records, patient is missing the following 2022 requirement(s) that must be completed by January 31, 2023 to avoid being discharged from the program:     2nd office visit in 2022 for diabetes   Yearly urine microalbumin test    Spoke with Ej at Dr. Sean Stringer office. Stated she could not give out information on DM OV or labs but took message for nurse in office to call back or fax information. Will await phone call or fax but if neither happens will try and find different number for office and call again. Patient not available at the time of call. Phone had [employer] program and disconnected after stating my name and reason for calling. Will try and call patient again if office does not send anything back.          Alejandra Max, [facility]   Phone: [phone]

## 2023-01-20 ENCOUNTER — CLINICAL DOCUMENTATION (OUTPATIENT)
Dept: PHARMACY | Facility: CLINIC | Age: 58
End: 2023-01-20

## 2023-01-20 NOTE — PROGRESS NOTES
Patient saw Dr. Ann Ortiz on 10/10/22 per call to office. Spoke with Sincere Foreman. Code not dropped due to visit being in 2022 and now 2023. Will update master database.        Delon Monahan, [address]   Phone: [phone]       For Pharmacy Admin Tracking Only    Program: [address] in place:  No  Gap Closed?: Yes   Time Spent (min): 5

## 2023-01-20 NOTE — TELEPHONE ENCOUNTER
Called office back on phone number found on last DM OV form scanned in. Spoke to Juan Jose Younger who stated patient was last by Dr. Prieto Bravo on 10/10/22. Will make separate encounter for this. Also stated urine microalbumin was done on 10/10/22. Asked for her to fax results in. Will update these in [address] [facility], [address]   Phone: [phone]       For Pharmacy Admin Tracking Only    Program: [address] in place:  No  Gap Closed?: Yes   Time Spent (min): 10

## 2023-01-26 ENCOUNTER — TELEPHONE (OUTPATIENT)
Dept: PHARMACY | Facility: CLINIC | Age: 58
End: 2023-01-26

## 2023-01-26 NOTE — TELEPHONE ENCOUNTER
2023 Annual Pharmacist Visit  **Patient is [facility]**    Called patient to schedule 2023 yearly pharmacist appointment to discuss medications for Diabetes Management Program.    Spoke to patient and appointment scheduled for 02/08/2023 @ [facility] [address]   Department, toll free: [phone] Option #3     For Pharmacy Admin Tracking Only    Program: [address] in place:  No  Recommendation Provided To: Patient/Caregiver: 1 via Telephone  Intervention Detail: Scheduled Appointment  Intervention Accepted By: Patient/Caregiver: 1  Gap Closed?: Yes   Time Spent (min): 5

## 2023-02-08 ENCOUNTER — TELEPHONE (OUTPATIENT)
Dept: PHARMACY | Facility: CLINIC | Age: 58
End: 2023-02-08

## 2023-02-08 RX ORDER — GLIPIZIDE 2.5 MG/1
2.5 TABLET, EXTENDED RELEASE ORAL
COMMUNITY

## 2023-02-08 NOTE — TELEPHONE ENCOUNTER
[facility] CLINICAL PHARMACY REVIEW - Be Well with Diabetes    Alyse Truong is a 56 y.o. female enrolled in the [address] [place], [place] [address]Th [facility] Employee Diabetes Program. Patient provided Lianet Christiansen with verbal consent to remain in the program for this year. Patient enrolled 17. Insurance through the following employer: [address] [place] [address]    Medications:  Current Outpatient Medications   Medication Instructions    atorvastatin (LIPITOR) 80 mg, Oral, EVERY EVENING    Cholecalciferol (VITAMIN D3) 125 MCG (5000 UT) TABS 1 tablet, Oral, DAILY    dapagliflozin (FARXIGA) 10 mg, Oral, EVERY MORNING - not taking due to yeast infection; stopped taking in 2022    lisinopril (PRINIVIL;ZESTRIL) 10 mg, Oral, DAILY    metFORMIN (GLUCOPHAGE-XR) 2,000 mg, Oral, EVERY EVENING, with food - tolerating well    pantoprazole (PROTONIX) 40 mg, Oral, DAILY    sertraline (ZOLOFT) 100 mg, Oral, EVERY EVENING    solifenacin (VESICARE) 10 mg, Oral, EVERY EVENING    Trulicity 1.5 mg, SubCUTAneous, WEEKLY - tolerating well; provider mentioned possible dose increase   New: Glipizide XL 2.5 mg QD with dinner - filled rx in October, but started taking in January    Reminded patient that she needs a new rx for metformin refills - she stated that she has a provider visit coming up for refills     Other OTC/Herbals: none    Treatment of choice for low blood glucose: reviewed treatment options    Current Pharmacy: [facility]    Current testing supplies/frequency: [facility] - has testing supplies; reminded patient that test strips will  within 3 months of opening. Current test strip rx expires today - patient stated that she will request a new rx from her provider for further refills.      Pen needles/syringes: n/a      Allergies:  No Known Allergies     Vitals/Labs:  Lab Results   Component Value Date    LABA1C 7.7 10/07/2022    LABA1C 7.2 2022    LABA1C 7.1 2021     BP Readings from Last 3 Encounters:   05/05/21 (!) 0/0   05/05/21 106/67   03/18/19 (!) 96/57     Lab Results   Component Value Date    CHOL 158 04/12/2022    TRIG 140 04/12/2022    HDL 46 04/12/2022    LDLCALC 84 04/12/2022    LDLDIRECT 61 08/06/2012     ALT   Date Value Ref Range Status   10/07/2022 31 10 - 40 U/L Final     AST   Date Value Ref Range Status   10/07/2022 44 (H) 15 - 37 U/L Final     The ASCVD Risk score (Arlene RODRIGUEZ, et al., 2019) failed to calculate for the following reasons: The systolic blood pressure is missing     Lab Results   Component Value Date    LABMICR Negative 08/12/2021     Component Ref Range & Units 1/24/19 0930 1/31/17 1030   Microalbumin, Random Urine <2.0 mg/dL <1.20  1.70    Creatinine, Ur 28.0 - 259.0 mg/dL 284.8 High   351.5 High     Microalbumin Creatinine Ratio 0.0 - 30.0 mg/g see below  4.8    Comment: Ratio cannot be calculated since microalbumin level is below   the lower detection limit. Lab Results   Component Value Date    CREATININE 0.7 10/07/2022     Estimated Creatinine Clearance: 113 mL/min (based on SCr of 0.7 mg/dL). Lab Results   Component Value Date/Time    LABGLOM >60 10/07/2022 07:36 AM    LABGLOM >60 03/08/2022 08:29 AM    LABGLOM >60 02/17/2020 10:30 AM    LABGLOM >60 11/15/2019 04:00 PM       Immunizations:  Immunization History   Administered Date(s) Administered    COVID-19, MODERNA BLUE border, Primary or Immunocompromised, (age 12y+), IM, 100 mcg/0.5mL 01/19/2021, 03/08/2021, 01/06/2022    Influenza Virus Vaccine 09/24/2013, 09/25/2015, 09/29/2016, 10/29/2018, 10/30/2019, 10/26/2021    Pneumococcal Polysaccharide ([id number]) 07/09/2013    Tdap (Boostrix, Adacel) 04/03/2009      Social History:  Social History     Tobacco Use    Smoking status: Never    Smokeless tobacco: Never   Substance Use Topics    Alcohol use: No       ASSESSMENT:  Initial Program Requirements (Y indicates has completed for the year, N indicates needs to be completed by 07/01/2023):   No - Provider Visit for DM (1st)  No - A1c (1st)  Outside provider - patient is aware of need to fax/email results    Ongoing Program Requirements (Y indicates has completed for the year, N indicates needs to be completed by 12/31/2023): No - Provider Visit for DM (2nd)  No - ACC/diabetes educator visit (if A1c over 8%)  No - A1c (2nd)  No - Lipid panel  No - Urine microalbumin  No - Pneumococcal vaccination: Olimpia Nur - may consider;  Pneumococcal Polysaccharide ([id number]) 07/09/2013   No - Influenza vaccination for Fall 2023  No - Medication adherence over 70% - patient just started glipizide in January 2023 (but filled in October 2022)  Yes - On statin or contraindication(s) - atorvastatin  Yes - On ACEi/ARB or contraindication(s) - lisinopril     Formulary Medication Review:  Non-formulary or medications with cost-effective alternatives: no cost concerns per patient     Current medications eligible for copay waiver, up to $600, through [employer]:  - atorvastatin, Trulicity, lisinopril, metformin XR, glipizide XL  - [facility]     Diabetes Care:   - Glycemic Goal: <7.0%. Is not at blood glucose goal which may be related to changes in diet. Recommend extra attention to diet. and which may be related to changes in physical activity. Recommend resuming physical activity. Type 2 DM under inadequate control as evidenced by:  LABA1C 7.7 10/07/2022     - Current symptoms/problems include none - does have dry mouth at times  - Home blood sugar records:  currently testing fasting BG 2 to 3 times a week and if she does not feel well; fasting BG has been running at 120-140's (sometimes higher). - Any episodes of hypoglycemia? no    - Known diabetic complications: none  - Eye exam current (within one year): yes  - Foot exam current (within one year): yes    - Therapy Optimization: Glipizide was started at last visit and the dose can be titrated. Trulicity dose can also be titrated.  Discussed potential weight gain with glipizide and weight loss with Trulicity. Patient will discuss options with provider at appointment next week. - Medication changes since last A1c: [place] was stopped in August (prior to Oct A1c)  - Medications/Classes already tried/failed: Fariga (yeast infections)  - Daily aspirin? No    - Medication compliance: compliant all of the time  - Diet compliance: noncompliant:    - tries to follow a healthy diet; has talked with a dietician; will also send patient a link to ADA site with diet information   - Current exercise: no regular exercise; walks while at work and has access to a fitness center that she may explore; reviewed current ADA recommedations for exercise    Other Considerations:  - Blood Pressure Goal: BP less than 130/80 mmHg due to history of DM: Is at blood pressure goal. Reviewed BP goal - monitors at home and is running 120/70's. - Lipids: Patient is prescribed high-intensity statin therapy. - Smoking status: Never smoked    PLAN:    - Consideration(s) for provider:   Future considerations: Can increase Trulicity dose; glipizide was recently started and dose can also be increased (not preferred due to weight gain).     - DM program gaps identified:   Initial requirements: Provider Visit for DM (1st) and A1c (1st)     Ongoing requirements: Provider visit for DM (2nd), ACC/diabetes educator visit (if A1c over 8%), A1c (2nd), Lipid panel, Urine microalbumin, Pneumococcal vaccination: [id number], Influenza vaccination for [id number], and Medication adherence over 70%     - Education to patient: Discussed general issues about diabetes pathophysiology and management., Addressed diet and exercise, Discussed foot care, Reminded to get yearly retinal exam, Addressed medication adherence, Overview of Be Well With Diabetes program, Overview of HHP, and Benefit/indication for pneumonia vaccine in patients with diabetes     - Follow up: PCP for identified gaps or as scheduled below    - Upcoming appointments:   No future appointments.     King Taj, PharmD, [facility], toll free: [phone], option 3    =======================================================  For Pharmacy Admin Tracking Only    Program: [address]:  No  Gap Closed?: Yes   Time Spent (min): 45

## 2023-05-19 ENCOUNTER — TELEPHONE (OUTPATIENT)
Dept: PHARMACY | Facility: CLINIC | Age: 58
End: 2023-05-19

## 2023-05-19 NOTE — TELEPHONE ENCOUNTER
[facility] [facility] Employee Diabetes Program    Carson Miguel is a 56 y.o. female enrolled in the [facility] with Diabetes Program. The goal of this voluntary program is to help employees and covered dependents reach their health maintenance goals in regards to their diabetes diagnosis. According to our records, patient is missing the following requirement(s) that must be completed by July 1, 2023 to avoid discharge from the program:    First diabetes visit with your provider in 2023  First A1c result in 2023     Plan:    Attempt made to reach patient by telephone to review above. Spoke to patient - verbalized understanding. Patient states she was seen and has AVS from visit. Educated can fax or email it to us and also need A1c. Contact information provided to patient to send us required documentation. Fax: [phone]  Email: [email or web address]     Sydni August PharmD, [address]argas Pharmacist  Department: [phone]     For Pharmacy Admin Tracking Only    Program: [address] Closed?: Yes   Time Spent (min): 10

## 2023-07-27 LAB
CHOLEST SERPL-MCNC: 135 MG/DL (ref 0–199)
EST. AVERAGE GLUCOSE BLD GHB EST-MCNC: 157.1 MG/DL
GLUCOSE SERPL-MCNC: 112 MG/DL (ref 70–99)
HBA1C MFR BLD: 7.1 %
HDLC SERPL-MCNC: 50 MG/DL (ref 40–60)
LDLC SERPL CALC-MCNC: 64 MG/DL
TRIGL SERPL-MCNC: 107 MG/DL (ref 0–150)

## 2023-07-31 ENCOUNTER — CLINICAL DOCUMENTATION (OUTPATIENT)
Dept: PHARMACY | Facility: CLINIC | Age: 58
End: 2023-07-31

## 2023-07-31 NOTE — PROGRESS NOTES
Pharmacy Pop Care Documentation:     Erma Virgen is being removed from the diabetes management program for the following reason(s): First MD Visit for DM    [address] Only    Program: [address] in place:  No  Gap Closed?: Yes   Time Spent (min): 5

## 2024-04-17 RX ORDER — SPIRONOLACTONE 100 MG/1
100 TABLET, FILM COATED ORAL DAILY
COMMUNITY

## 2024-04-17 RX ORDER — TIRZEPATIDE 7.5 MG/.5ML
7.5 INJECTION, SOLUTION SUBCUTANEOUS WEEKLY
COMMUNITY

## 2024-04-17 RX ORDER — METFORMIN HYDROCHLORIDE 500 MG/1
1000 TABLET, FILM COATED, EXTENDED RELEASE ORAL
COMMUNITY

## 2024-04-17 RX ORDER — FUROSEMIDE 40 MG/1
40 TABLET ORAL DAILY
COMMUNITY

## 2024-05-01 ENCOUNTER — ANESTHESIA (OUTPATIENT)
Dept: ENDOSCOPY | Age: 59
End: 2024-05-01
Payer: COMMERCIAL

## 2024-05-01 ENCOUNTER — ANESTHESIA EVENT (OUTPATIENT)
Dept: ENDOSCOPY | Age: 59
End: 2024-05-01
Payer: COMMERCIAL

## 2024-05-01 ENCOUNTER — HOSPITAL ENCOUNTER (OUTPATIENT)
Age: 59
Setting detail: OUTPATIENT SURGERY
Discharge: HOME | End: 2024-05-01
Attending: INTERNAL MEDICINE | Admitting: INTERNAL MEDICINE
Payer: COMMERCIAL

## 2024-05-01 VITALS
HEIGHT: 67 IN | TEMPERATURE: 97.1 F | WEIGHT: 250 LBS | HEART RATE: 85 BPM | BODY MASS INDEX: 39.24 KG/M2 | SYSTOLIC BLOOD PRESSURE: 96 MMHG | RESPIRATION RATE: 13 BRPM | OXYGEN SATURATION: 98 % | DIASTOLIC BLOOD PRESSURE: 57 MMHG

## 2024-05-01 DIAGNOSIS — K21.9 CHRONIC GERD: ICD-10-CM

## 2024-05-01 DIAGNOSIS — R10.13 EPIGASTRIC PAIN: ICD-10-CM

## 2024-05-01 DIAGNOSIS — K74.60 HEPATIC CIRRHOSIS, UNSPECIFIED HEPATIC CIRRHOSIS TYPE, UNSPECIFIED WHETHER ASCITES PRESENT (HCC): ICD-10-CM

## 2024-05-01 LAB
GLUCOSE BLD-MCNC: 108 MG/DL (ref 70–99)
GLUCOSE BLD-MCNC: 130 MG/DL (ref 70–99)
PERFORMED ON: ABNORMAL
PERFORMED ON: ABNORMAL

## 2024-05-01 PROCEDURE — 7100000010 HC PHASE II RECOVERY - FIRST 15 MIN: Performed by: INTERNAL MEDICINE

## 2024-05-01 PROCEDURE — 2709999900 HC NON-CHARGEABLE SUPPLY: Performed by: INTERNAL MEDICINE

## 2024-05-01 PROCEDURE — 7100000011 HC PHASE II RECOVERY - ADDTL 15 MIN: Performed by: INTERNAL MEDICINE

## 2024-05-01 PROCEDURE — 3700000001 HC ADD 15 MINUTES (ANESTHESIA): Performed by: INTERNAL MEDICINE

## 2024-05-01 PROCEDURE — 3609012400 HC EGD TRANSORAL BIOPSY SINGLE/MULTIPLE: Performed by: INTERNAL MEDICINE

## 2024-05-01 PROCEDURE — 3700000000 HC ANESTHESIA ATTENDED CARE: Performed by: INTERNAL MEDICINE

## 2024-05-01 PROCEDURE — 88305 TISSUE EXAM BY PATHOLOGIST: CPT

## 2024-05-01 PROCEDURE — 88342 IMHCHEM/IMCYTCHM 1ST ANTB: CPT

## 2024-05-01 PROCEDURE — 6360000002 HC RX W HCPCS: Performed by: NURSE ANESTHETIST, CERTIFIED REGISTERED

## 2024-05-01 RX ORDER — SODIUM CHLORIDE 0.9 % (FLUSH) 0.9 %
5-40 SYRINGE (ML) INJECTION PRN
Status: DISCONTINUED | OUTPATIENT
Start: 2024-05-01 | End: 2024-05-01 | Stop reason: HOSPADM

## 2024-05-01 RX ORDER — SODIUM CHLORIDE 9 MG/ML
INJECTION, SOLUTION INTRAVENOUS PRN
Status: DISCONTINUED | OUTPATIENT
Start: 2024-05-01 | End: 2024-05-01 | Stop reason: HOSPADM

## 2024-05-01 RX ORDER — LIDOCAINE HYDROCHLORIDE 10 MG/ML
0.3 INJECTION, SOLUTION EPIDURAL; INFILTRATION; INTRACAUDAL; PERINEURAL
Status: DISCONTINUED | OUTPATIENT
Start: 2024-05-01 | End: 2024-05-01 | Stop reason: HOSPADM

## 2024-05-01 RX ORDER — ONDANSETRON 2 MG/ML
4 INJECTION INTRAMUSCULAR; INTRAVENOUS
Status: DISCONTINUED | OUTPATIENT
Start: 2024-05-01 | End: 2024-05-01 | Stop reason: HOSPADM

## 2024-05-01 RX ORDER — DIPHENHYDRAMINE HYDROCHLORIDE 50 MG/ML
12.5 INJECTION INTRAMUSCULAR; INTRAVENOUS
Status: DISCONTINUED | OUTPATIENT
Start: 2024-05-01 | End: 2024-05-01 | Stop reason: HOSPADM

## 2024-05-01 RX ORDER — LABETALOL HYDROCHLORIDE 5 MG/ML
5 INJECTION, SOLUTION INTRAVENOUS EVERY 10 MIN PRN
Status: DISCONTINUED | OUTPATIENT
Start: 2024-05-01 | End: 2024-05-01 | Stop reason: HOSPADM

## 2024-05-01 RX ORDER — MEPERIDINE HYDROCHLORIDE 25 MG/ML
12.5 INJECTION INTRAMUSCULAR; INTRAVENOUS; SUBCUTANEOUS EVERY 5 MIN PRN
Status: DISCONTINUED | OUTPATIENT
Start: 2024-05-01 | End: 2024-05-01 | Stop reason: HOSPADM

## 2024-05-01 RX ORDER — SODIUM CHLORIDE 0.9 % (FLUSH) 0.9 %
5-40 SYRINGE (ML) INJECTION EVERY 12 HOURS SCHEDULED
Status: DISCONTINUED | OUTPATIENT
Start: 2024-05-01 | End: 2024-05-01 | Stop reason: HOSPADM

## 2024-05-01 RX ORDER — OXYCODONE HYDROCHLORIDE 5 MG/1
10 TABLET ORAL PRN
Status: DISCONTINUED | OUTPATIENT
Start: 2024-05-01 | End: 2024-05-01 | Stop reason: HOSPADM

## 2024-05-01 RX ORDER — NALOXONE HYDROCHLORIDE 0.4 MG/ML
INJECTION, SOLUTION INTRAMUSCULAR; INTRAVENOUS; SUBCUTANEOUS PRN
Status: DISCONTINUED | OUTPATIENT
Start: 2024-05-01 | End: 2024-05-01 | Stop reason: HOSPADM

## 2024-05-01 RX ORDER — PROPOFOL 10 MG/ML
INJECTION, EMULSION INTRAVENOUS PRN
Status: DISCONTINUED | OUTPATIENT
Start: 2024-05-01 | End: 2024-05-01 | Stop reason: SDUPTHER

## 2024-05-01 RX ORDER — OXYCODONE HYDROCHLORIDE 5 MG/1
5 TABLET ORAL PRN
Status: DISCONTINUED | OUTPATIENT
Start: 2024-05-01 | End: 2024-05-01 | Stop reason: HOSPADM

## 2024-05-01 RX ORDER — SODIUM CHLORIDE, SODIUM LACTATE, POTASSIUM CHLORIDE, CALCIUM CHLORIDE 600; 310; 30; 20 MG/100ML; MG/100ML; MG/100ML; MG/100ML
INJECTION, SOLUTION INTRAVENOUS CONTINUOUS
Status: DISCONTINUED | OUTPATIENT
Start: 2024-05-01 | End: 2024-05-01 | Stop reason: HOSPADM

## 2024-05-01 RX ADMIN — PROPOFOL 250 MG: 10 INJECTION, EMULSION INTRAVENOUS at 10:42

## 2024-05-01 ASSESSMENT — PAIN - FUNCTIONAL ASSESSMENT
PAIN_FUNCTIONAL_ASSESSMENT: 0-10
PAIN_FUNCTIONAL_ASSESSMENT: 0-10

## 2024-05-01 ASSESSMENT — PAIN SCALES - GENERAL
PAINLEVEL_OUTOF10: 0

## 2024-05-01 ASSESSMENT — PAIN DESCRIPTION - DESCRIPTORS: DESCRIPTORS: OTHER (COMMENT)

## 2024-05-01 NOTE — ANESTHESIA POSTPROCEDURE EVALUATION
Results       Component                Value               Date/Time                  NA                       141                 10/07/2022 07:36 AM        K                        3.8                 10/07/2022 07:36 AM        CL                       104                 10/07/2022 07:36 AM        CO2                      26                  10/07/2022 07:36 AM        BUN                      6 (L)               10/07/2022 07:36 AM        CREATININE               0.7                 10/07/2022 07:36 AM        GLUCOSE                  112 (H)             07/27/2023 06:55 AM   COAGS  Lab Results       Component                Value               Date/Time                  PROTIME                  13.9                11/04/2022 04:32 PM        INR                      1.09                11/04/2022 04:32 PM     Intake & Output:  @24HRIO@    Nausea & Vomiting:  No    Level of Consciousness:  Awake    Pain Assessment:  Adequate analgesia    Anesthesia Complications:  No apparent anesthetic complications    SUMMARY      Vital signs stable  OK to discharge from Stage I post anesthesia care.  Care transferred from Anesthesiology department on discharge from perioperative area     No notable events documented.

## 2024-05-01 NOTE — PROGRESS NOTES
Pre-Operative:  1.  Patient/Caregiver identifies - states name and date of birth.  2.  The patient is free from signs and symptoms of injury.  3.  The patient receives appropriate medication(s), safely administered during the Perioperative period.  4.  The patients's fluid, electrolyte, and acid-base balances are established preoperatively.  5.  The patient's pulmonary function is established preoperatively.  6.  The patient's cardiovascular status is established preoperatively.  7.  The patient / caregiver demonstrates knowledge of nutritional management related to the operative or other invasive procedure.  8.  The patient/caregiver demonstrates knowledge of medication management.  9.  The patient/caregiver demonstrates knowledge of pain management.  10.  The patient/caregiver participates in decisions affection his or her Perioperative plan of care.  11.  The patient's care is consistent with the individualized Perioperative plan of care.  12.  The patient's right to privacy is maintained.  13.  The patient is the recipient of competent and ethical care within legal standards of practice.  14.  The patient's value system, lifestyle, ethnicity, and culture are considered, respected, and incorporated in the Perioperative plan of care and understands special services available.  15.  The patient demonstrates and/or reports adequate pain control throughout the the Perioperative period.  16.  The patient's neurological status is established preoperatively.  17.  The patient/caregiver demonstrates knowledge of the expected responses to the endoscopy procedure.  18.  Patient/Caregiver has reduced anxiety.  Interventions- Familiarize with environment and equipment.  19. Patient/Caregiver verbalizes understanding of Phase II and/or Phase I process.  20.  Patient pain level is established preoperatively using age appropriate pain scale.  21.  The patient will move to fall risk upon sedation- during and through the recovery 
Spoke with patient aware of 0930 arrival time 05/01/2024.Vicki De Guzman RN  
now and the time of your surgery.        To provide excellent care, visitors will be limited to two in a room at any given time.  Please no children under the age of 14 in the surgical department.

## 2024-05-01 NOTE — H&P
History and Physical / Pre-Sedation Assessment    Patient:  Saadia Mendez   :   1965     Intended Procedure:  EGD    HPI: 58 year old female with history of DM, HTN, HLD, Cirrhosis and GERD presents for diagnostic and screening EGD    Past Medical History:   Diagnosis Date    Depression     GERD (gastroesophageal reflux disease)     Hyperlipidemia     Incontinence     Neuropathy     FEET    Type 2 diabetes mellitus without complication (HCC)     Wears partial dentures     UPPER AND LOWER     Past Surgical History:   Procedure Laterality Date    CHOLECYSTECTOMY      COLONOSCOPY      ENDOSCOPY, COLON, DIAGNOSTIC      HYSTERECTOMY (CERVIX STATUS UNKNOWN)      [place] LAPAROSCOPY SLING OPERATION STRESS INCONT N/A 3/18/2019    CYSTOSCOPY, WITH TRANSOBTURATOR TAPE (TOT) performed by India Duckworth MD at [facility] OR    [place] SLING OPERATION STRESS INCONTINENCE N/A 2018    INSERTION AND REMOVAL OF TRANSOBTURATOR TAPING (TOT) WITH CYSTOSCOPY performed by India Duckworth MD at [facility] OR    UPPER GASTROINTESTINAL ENDOSCOPY N/A 2021    EGD BIOPSY performed by John Casanova MD at [facility] ENDOSCOPY    UPPER GASTROINTESTINAL ENDOSCOPY  2021    EGD DILATION SAVORY performed by John Casanova MD at [facility] ENDOSCOPY    WISDOM TOOTH EXTRACTION         Medications reviewed  Prior to Admission medications    Medication Sig Start Date End Date Taking? Authorizing Provider   metFORMIN, MOD, (GLUMETZA) 500 MG extended release tablet Take 2 tablets by mouth daily (with breakfast)   Yes Edward Decker MD   Tirzepatide (MOUNJARO) 7.5 MG/0.5ML SOPN SC injection Inject 0.5 mLs into the skin once a week   Yes Edward Decker MD   spironolactone (ALDACTONE) 100 MG tablet Take 1 tablet by mouth daily   Yes Edward Decker MD   furosemide (LASIX) 40 MG tablet Take 1 tablet by mouth daily   Yes Edward Decker MD   glipiZIDE (GLUCOTROL XL) 2.5 MG extended release

## 2024-05-01 NOTE — DISCHARGE INSTRUCTIONS
PATIENT INSTRUCTIONS  POST-SEDATION    Saadia Mendez          IMMEDIATELY FOLLOWING PROCEDURE:    Do not drive or operate machinery for the first twenty four hours after surgery.     Do not make any important decisions for twenty four hours after surgery or while taking narcotic pain medications or sedatives.     You should NOT BE LEFT UNATTENDED OR ALONE. A responsible adult should be with you for the rest of the day of your procedure and also during the night for your protection and safety.    You may experience some light headedness. Rest at home with activity as tolerated. You may not need to go to bed, but it is important to rest for the next 24 hours. You should not engage in athletic sports such as basketball, volleyball, jogging, skating, or activities requiring refined motor skills for 24 hours.   If you develop intractable nausea and vomiting or a severe headache please notify your doctor immediately.   You are not expected to have any fever, but if you feel warm, take your temperature. If you have a fever 101 degrees or higher, call your doctor.     If you have had an Endoscopy:   *Eat lightly for your first meal and gradually resume your normal / prescribed diet. DO NOT eat or drink until your gag reflex returns.   *If you have a sore throat you may use lozenges, or salt water gargles.   *If you have had a colonoscopy, do not expect a normal bowel movement for approximately three days due to the cleansing of the large intestine prior to colonoscopy.    ONCE YOU ARE HOME, IF YOU SHOULD HAVE:  Difficulty in breathing, persistent nausea or vomiting, bleeding you feel is excessive, or pain that is unusual, increased abdominal bloating, or any swelling, fever / chills, call your physician. If you cannot contact your physician, but feel that your signs and symptoms need a physician's attention, go to the Emergency Department.      FOLLOW-UP:    Please follow up with Dr. Burrell as scheduled or needed.

## 2024-05-01 NOTE — ANESTHESIA PRE PROCEDURE
Department of Anesthesiology  Preprocedure Note       Name:  Saadia Mendez   Age:  58 y.o.  :  1965                                          MRN:  [id number]         Date:  2024      Surgeon: Surgeon(s):  John Casanova MD    Procedure: Procedure(s):  EGD W/ANES. (8:30)    Medications prior to admission:   Prior to Admission medications    Medication Sig Start Date End Date Taking? Authorizing Provider   metFORMIN, MOD, (GLUMETZA) 500 MG extended release tablet Take 2 tablets by mouth daily (with breakfast)   Yes Edward Decker MD   Tirzepatide (MOUNJARO) 7.5 MG/0.5ML SOPN SC injection Inject 0.5 mLs into the skin once a week   Yes Edward Decker MD   spironolactone (ALDACTONE) 100 MG tablet Take 1 tablet by mouth daily   Yes Edward Decker MD   furosemide (LASIX) 40 MG tablet Take 1 tablet by mouth daily   Yes Edward Decker MD   glipiZIDE (GLUCOTROL XL) 2.5 MG extended release tablet Take 1 tablet by mouth Daily with supper    Edward Decker MD   pantoprazole (PROTONIX) 40 MG tablet Take 1 tablet by mouth daily    Edward Decker MD   lisinopril (PRINIVIL;ZESTRIL) 10 MG tablet Take 1 tablet by mouth daily    Edward Decker MD   Cholecalciferol (VITAMIN D3) 125 MCG (5000 UT) TABS Take 1 tablet by mouth daily    Edward Decker MD   solifenacin (VESICARE) 10 MG tablet Take 1 tablet by mouth every evening    Edward Decker MD   atorvastatin (LIPITOR) 80 MG tablet Take 1 tablet by mouth every evening    Edward Decker MD   sertraline (ZOLOFT) 100 MG tablet Take 1 tablet by mouth every evening    Edward Decker MD       Current medications:    Current Facility-Administered Medications   Medication Dose Route Frequency Provider Last Rate Last Admin    lidocaine PF 1 % injection 0.3 mL  0.3 mL IntraDERmal Once PRN Jefe Zabala MD        lactated ringers IV soln infusion   IntraVENous Continuous Jefe Zabala MD

## 2024-05-24 ENCOUNTER — TRANSCRIBE ORDERS (OUTPATIENT)
Dept: ADMINISTRATIVE | Age: 59
End: 2024-05-24

## 2024-05-24 DIAGNOSIS — K74.60 HEPATIC CIRRHOSIS, UNSPECIFIED HEPATIC CIRRHOSIS TYPE, UNSPECIFIED WHETHER ASCITES PRESENT (HCC): Primary | ICD-10-CM

## 2024-08-23 ENCOUNTER — HOSPITAL ENCOUNTER (OUTPATIENT)
Age: 59
End: 2024-08-23
Attending: INTERNAL MEDICINE
Payer: COMMERCIAL

## 2024-08-23 ENCOUNTER — HOSPITAL ENCOUNTER (OUTPATIENT)
Dept: ULTRASOUND IMAGING | Age: 59
Discharge: HOME | End: 2024-08-23
Attending: INTERNAL MEDICINE
Payer: COMMERCIAL

## 2024-08-23 VITALS
BODY MASS INDEX: 39.24 KG/M2 | HEIGHT: 67 IN | WEIGHT: 250 LBS | SYSTOLIC BLOOD PRESSURE: 96 MMHG | DIASTOLIC BLOOD PRESSURE: 57 MMHG

## 2024-08-23 DIAGNOSIS — K76.1 CHRONIC PASSIVE CONGESTION OF LIVER: ICD-10-CM

## 2024-08-23 DIAGNOSIS — K74.60 HEPATIC CIRRHOSIS, UNSPECIFIED HEPATIC CIRRHOSIS TYPE, UNSPECIFIED WHETHER ASCITES PRESENT (HCC): ICD-10-CM

## 2024-08-23 LAB
ECHO AO ASC DIAM: 3.1 CM
ECHO AO ASCENDING AORTA INDEX: 1.4 CM/M2
ECHO AO ROOT DIAM: 3.5 CM
ECHO AO ROOT INDEX: 1.58 CM/M2
ECHO AV MEAN GRADIENT: 6 MMHG
ECHO AV MEAN VELOCITY: 1.1 M/S
ECHO AV PEAK GRADIENT: 9 MMHG
ECHO AV PEAK VELOCITY: 1.5 M/S
ECHO AV VELOCITY RATIO: 0.73
ECHO AV VTI: 31 CM
ECHO BSA: 2.32 M2
ECHO EST RA PRESSURE: 8 MMHG
ECHO IVC EXP: 1.6 CM
ECHO IVC INSP: 1.5 CM
ECHO LA AREA 2C: 16 CM2
ECHO LA AREA 4C: 16 CM2
ECHO LA MAJOR AXIS: 5.8 CM
ECHO LA MINOR AXIS: 5.1 CM
ECHO LA VOL BP: 41 ML (ref 22–52)
ECHO LA VOL MOD A2C: 41 ML (ref 22–52)
ECHO LA VOL MOD A4C: 38 ML (ref 22–52)
ECHO LA VOL/BSA BIPLANE: 18 ML/M2 (ref 16–34)
ECHO LA VOLUME INDEX MOD A2C: 18 ML/M2 (ref 16–34)
ECHO LA VOLUME INDEX MOD A4C: 17 ML/M2 (ref 16–34)
ECHO LV E' LATERAL VELOCITY: 17 CM/S
ECHO LV E' SEPTAL VELOCITY: 10 CM/S
ECHO LV EF PHYSICIAN: 55 %
ECHO LV FRACTIONAL SHORTENING: 34 % (ref 28–44)
ECHO LV INTERNAL DIMENSION DIASTOLE INDEX: 1.98 CM/M2
ECHO LV INTERNAL DIMENSION DIASTOLIC: 4.4 CM (ref 3.9–5.3)
ECHO LV INTERNAL DIMENSION SYSTOLIC INDEX: 1.31 CM/M2
ECHO LV INTERNAL DIMENSION SYSTOLIC: 2.9 CM
ECHO LV ISOVOLUMETRIC RELAXATION TIME (IVRT): 100 MS
ECHO LV IVSD: 0.8 CM (ref 0.6–0.9)
ECHO LV MASS 2D: 109.4 G (ref 67–162)
ECHO LV MASS INDEX 2D: 49.3 G/M2 (ref 43–95)
ECHO LV POSTERIOR WALL DIASTOLIC: 0.8 CM (ref 0.6–0.9)
ECHO LV RELATIVE WALL THICKNESS RATIO: 0.36
ECHO LVOT AV VTI INDEX: 0.68
ECHO LVOT MEAN GRADIENT: 3 MMHG
ECHO LVOT PEAK GRADIENT: 5 MMHG
ECHO LVOT PEAK VELOCITY: 1.1 M/S
ECHO LVOT VTI: 21.1 CM
ECHO MV A VELOCITY: 0.7 M/S
ECHO MV E DECELERATION TIME (DT): 169 MS
ECHO MV E VELOCITY: 0.9 M/S
ECHO MV E/A RATIO: 1.29
ECHO MV E/E' LATERAL: 5.29
ECHO MV E/E' RATIO (AVERAGED): 7.15
ECHO MV E/E' SEPTAL: 9
ECHO MV LVOT VTI INDEX: 1.51
ECHO MV MAX VELOCITY: 1.2 M/S
ECHO MV MEAN GRADIENT: 5 MMHG
ECHO MV MEAN VELOCITY: 0.8 M/S
ECHO MV PEAK GRADIENT: 5 MMHG
ECHO MV VTI: 31.8 CM
ECHO PV MAX VELOCITY: 1.4 M/S
ECHO PV MEAN GRADIENT: 4 MMHG
ECHO PV MEAN VELOCITY: 1 M/S
ECHO PV PEAK GRADIENT: 8 MMHG
ECHO PV VTI: 28.1 CM
ECHO RA AREA 4C: 11.6 CM2
ECHO RA END SYSTOLIC VOLUME APICAL 4 CHAMBER INDEX BSA: 12 ML/M2
ECHO RA VOLUME: 27 ML
ECHO RIGHT VENTRICULAR SYSTOLIC PRESSURE (RVSP): 29 MMHG
ECHO RV BASAL DIMENSION: 2.8 CM
ECHO RV FREE WALL PEAK S': 17 CM/S
ECHO RV MID DIMENSION: 2.4 CM
ECHO RV TAPSE: 2.8 CM (ref 1.7–?)
ECHO TV REGURGITANT MAX VELOCITY: 2.3 M/S
ECHO TV REGURGITANT PEAK GRADIENT: 22 MMHG

## 2024-08-23 PROCEDURE — 93306 TTE W/DOPPLER COMPLETE: CPT

## 2024-08-23 PROCEDURE — 93306 TTE W/DOPPLER COMPLETE: CPT | Performed by: INTERNAL MEDICINE

## 2024-08-23 PROCEDURE — 76705 ECHO EXAM OF ABDOMEN: CPT

## 2024-08-26 ENCOUNTER — HOSPITAL ENCOUNTER (OUTPATIENT)
Dept: MAMMOGRAPHY | Age: 59
Discharge: HOME | End: 2024-08-26
Payer: COMMERCIAL

## 2024-08-26 DIAGNOSIS — Z12.39 SCREENING BREAST EXAMINATION: ICD-10-CM

## 2024-08-26 PROCEDURE — 77063 BREAST TOMOSYNTHESIS BI: CPT

## 2024-09-10 LAB
CHOLEST SERPL-MCNC: 116 MG/DL (ref 0–199)
GLUCOSE SERPL-MCNC: 127 MG/DL (ref 70–99)
HDLC SERPL-MCNC: 43 MG/DL (ref 40–60)
LDLC SERPL CALC-MCNC: 56 MG/DL
TRIGL SERPL-MCNC: 85 MG/DL (ref 0–150)

## 2024-09-11 LAB
EST. AVERAGE GLUCOSE BLD GHB EST-MCNC: 128.4 MG/DL
HBA1C MFR BLD: 6.1 %

## 2025-06-19 ENCOUNTER — TRANSCRIBE ORDERS (OUTPATIENT)
Facility: HOSPITAL | Age: 60
End: 2025-06-19

## 2025-06-19 DIAGNOSIS — M84.371A STRESS FRACTURE OF RIGHT ANKLE, INITIAL ENCOUNTER: Primary | ICD-10-CM

## 2025-07-21 LAB
CHOLEST SERPL-MCNC: 151 MG/DL (ref 0–199)
GLUCOSE SERPL-MCNC: 205 MG/DL (ref 70–99)
HDLC SERPL-MCNC: 57 MG/DL (ref 40–60)
LDLC SERPL CALC-MCNC: 75 MG/DL
TRIGL SERPL-MCNC: 97 MG/DL (ref 0–150)

## 2025-07-22 LAB
EST. AVERAGE GLUCOSE BLD GHB EST-MCNC: 231.7 MG/DL
HBA1C MFR BLD: 9.7 %

## 2025-08-22 ENCOUNTER — CLINICAL DOCUMENTATION (OUTPATIENT)
Dept: PHARMACY | Facility: CLINIC | Age: 60
End: 2025-08-22

## (undated) DEVICE — DRAPE,UNDERBUTTOCKS,PCH,STERILE: Brand: MEDLINE

## (undated) DEVICE — Device

## (undated) DEVICE — SUTURE VCRL SZ 2-0 L27IN ABSRB UD L26MM SH 1/2 CIR J417H

## (undated) DEVICE — PACK,CYSTOSCOPY,PK III,AURORA: Brand: MEDLINE

## (undated) DEVICE — DRAINBAG,ANTI-REFLUX TOWER,L/F,2000ML,LL: Brand: MEDLINE

## (undated) DEVICE — PAD SANITARY MTRN TAB BELT WRP NS 11IN

## (undated) DEVICE — ELECTRODE PT RET AD L9FT HI MOIST COND ADH HYDRGEL CORDED

## (undated) DEVICE — GLOVE SURG SZ 65 L12IN FNGR THK87MIL WHT LTX FREE

## (undated) DEVICE — COVER LT HNDL BLU PLAS

## (undated) DEVICE — SOLUTION SCRB 4OZ 10% POVIDONE IOD ANTIMIC BTL

## (undated) DEVICE — SPONGE GZ W4XL4IN COT 12 PLY TYP VII WVN C FLD DSGN

## (undated) DEVICE — Z INACTIVE USE 2635503 SOLUTION IRRIG 3000ML ST H2O USP UROMATIC PLAS CONT

## (undated) DEVICE — CATHETER URETH 18FR 2CC BLLN SIL ELASTMR F 2 W BARDX

## (undated) DEVICE — STERILE SURGICAL LUBRICANT, METAL TUBE: Brand: SURGILUBE

## (undated) DEVICE — 1010 S-DRAPE TOWEL DRAPE 10/BX: Brand: STERI-DRAPE™

## (undated) DEVICE — Z DISCONTINUED BY MEDLINE USE 2711682 TRAY SKIN PREP DRY W/ PREM GLV

## (undated) DEVICE — SOLUTION IV IRRIG POUR BRL 0.9% SODIUM CHL 2F7124

## (undated) DEVICE — CANNULA,OXY,ADULT,SUPERSOFT,W/7'TUB,SC: Brand: MEDLINE INDUSTRIES, INC.

## (undated) DEVICE — HOOK RETRCT 12MM S STL BLNT E STAY LONE STAR

## (undated) DEVICE — YANKAUER,BULB TIP,W/O VENT,RIGID,STERILE: Brand: MEDLINE

## (undated) DEVICE — BAG DRNGE 19OZ VYN LEG FLIP-FLO VLV FAB STRP DISPOZ-A-BG

## (undated) DEVICE — TUBING, SUCTION, 1/4" X 12', STRAIGHT: Brand: MEDLINE

## (undated) DEVICE — SYRINGE IRRIG 60ML SFT PLIABLE BLB EZ TO GRP 1 HND USE W/

## (undated) DEVICE — ELECTRODE ECG MONITR FOAM TEAR DROP ADLT RED

## (undated) DEVICE — ENDOSCOPIC KIT 2 12 FT OP4 DE2 GWN SYR

## (undated) DEVICE — MINOR SET UP PK

## (undated) DEVICE — KIT OR ROOM TURNOVER W/STRAP

## (undated) DEVICE — SYRINGE MED 10ML LUERLOCK TIP W/O SFTY DISP

## (undated) DEVICE — SOLUTION PREP POVIDONE IOD FOR SKIN MUCOUS MEM PRIOR TO

## (undated) DEVICE — NEEDLE SPNL 22GA L3.5IN BLK HUB S STL REG WALL FIT STYL W/

## (undated) DEVICE — CONMED SCOPE SAVER BITE BLOCK, 20X27 MM: Brand: SCOPE SAVER

## (undated) DEVICE — ENDO CARRY-ON PROCEDURE KIT INCLUDES SUCTION TUBING, LUBRICANT, GAUZE, BIOHAZARD STICKER, TRANSPORT PAD AND INTERCEPT BEDSIDE KIT.: Brand: ENDO CARRY-ON PROCEDURE KIT

## (undated) DEVICE — Y-TYPE TUR/BLADDER IRRIGATION SET, REGULATING CLAMP

## (undated) DEVICE — FORCEP BX STD CAP 240CM RAD JAW 4

## (undated) DEVICE — SUTURE PERMA-HAND SZ 2-0 L30IN NONABSORBABLE BLK L26MM SH K833H

## (undated) DEVICE — CANISTER, RIGID, 1200CC: Brand: MEDLINE INDUSTRIES, INC.

## (undated) DEVICE — NEEDLE HYPO 25GA L1.5IN BVL ORIENTED ECLIPSE

## (undated) DEVICE — SAVARY GILLIARD WIRE GUIDE: Brand: SAVARY GILLIARD

## (undated) DEVICE — SKIN AFFIX SURG ADHESIVE 72/CS 0.55ML: Brand: MEDLINE